# Patient Record
Sex: FEMALE | Race: OTHER | Employment: STUDENT | URBAN - METROPOLITAN AREA
[De-identification: names, ages, dates, MRNs, and addresses within clinical notes are randomized per-mention and may not be internally consistent; named-entity substitution may affect disease eponyms.]

---
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
## 2021-03-29 ENCOUNTER — HOSPITAL ENCOUNTER (OUTPATIENT)
Age: 21
Discharge: HOME | End: 2021-03-29
Payer: COMMERCIAL

## 2021-03-29 VITALS
RESPIRATION RATE: 16 BRPM | WEIGHT: 160 LBS | HEIGHT: 62 IN | SYSTOLIC BLOOD PRESSURE: 112 MMHG | BODY MASS INDEX: 29.44 KG/M2 | OXYGEN SATURATION: 98 % | DIASTOLIC BLOOD PRESSURE: 73 MMHG | HEART RATE: 74 BPM | TEMPERATURE: 97 F

## 2021-03-29 DIAGNOSIS — J02.0 STREPTOCOCCAL SORE THROAT: Primary | ICD-10-CM

## 2021-03-29 DIAGNOSIS — Z20.822 ENCOUNTER FOR LABORATORY TESTING FOR COVID-19 VIRUS: ICD-10-CM

## 2021-03-29 LAB
S PYO AG THROAT QL: POSITIVE
SARS-COV-2 RNA RESP QL NAA+PROBE: NOT DETECTED

## 2021-03-29 PROCEDURE — U0002 COVID-19 LAB TEST NON-CDC: HCPCS | Performed by: NURSE PRACTITIONER

## 2021-03-29 PROCEDURE — 99213 OFFICE O/P EST LOW 20 MIN: CPT | Performed by: NURSE PRACTITIONER

## 2021-03-29 PROCEDURE — 87880 STREP A ASSAY W/OPTIC: CPT | Performed by: NURSE PRACTITIONER

## 2021-03-29 RX ORDER — PENICILLIN V POTASSIUM 500 MG/1
500 TABLET ORAL 2 TIMES DAILY
Qty: 20 TABLET | Refills: 0 | Status: SHIPPED | OUTPATIENT
Start: 2021-03-29 | End: 2021-04-08

## 2021-03-29 NOTE — ED PROVIDER NOTES
Patient Seen in: [facility]      History   Patient presents with:  Sore Throat    Stated Complaint: sorethroat    HPI/Subjective:   Well-appearing 72-year-old female with no past medical history, presents with complaints of a sore throat for Pharynx: Uvula midline. Posterior oropharyngeal erythema present. No pharyngeal swelling, oropharyngeal exudate or uvula swelling. Tonsils: Tonsillar exudate present. No tonsillar abscesses. 1+ on the right. 1+ on the left.      Neck: No cervical day for 10 days.   Qty: 20 tablet Refills: 0

## 2021-05-18 ENCOUNTER — HOSPITAL ENCOUNTER (OUTPATIENT)
Age: 21
Discharge: HOME | End: 2021-05-18
Payer: COMMERCIAL

## 2021-05-18 VITALS
HEIGHT: 63 IN | SYSTOLIC BLOOD PRESSURE: 123 MMHG | HEART RATE: 92 BPM | WEIGHT: 154 LBS | DIASTOLIC BLOOD PRESSURE: 67 MMHG | TEMPERATURE: 99 F | OXYGEN SATURATION: 99 % | RESPIRATION RATE: 18 BRPM | BODY MASS INDEX: 27.29 KG/M2

## 2021-05-18 DIAGNOSIS — J02.0 STREP PHARYNGITIS: ICD-10-CM

## 2021-05-18 DIAGNOSIS — J02.9 SORE THROAT: Primary | ICD-10-CM

## 2021-05-18 PROCEDURE — 87880 STREP A ASSAY W/OPTIC: CPT | Performed by: NURSE PRACTITIONER

## 2021-05-18 PROCEDURE — 99213 OFFICE O/P EST LOW 20 MIN: CPT | Performed by: NURSE PRACTITIONER

## 2021-05-18 RX ORDER — PENICILLIN V POTASSIUM 500 MG/1
500 TABLET ORAL 2 TIMES DAILY
Qty: 20 TABLET | Refills: 0 | Status: SHIPPED | OUTPATIENT
Start: 2021-05-18 | End: 2021-05-18 | Stop reason: SDUPTHER

## 2021-05-18 RX ORDER — PENICILLIN V POTASSIUM 500 MG/1
500 TABLET ORAL 2 TIMES DAILY
Qty: 20 TABLET | Refills: 0 | Status: SHIPPED | OUTPATIENT
Start: 2021-05-18 | End: 2021-05-28

## 2021-05-19 NOTE — ED PROVIDER NOTES
Patient Seen in: [facility]      History   Patient presents with:  Sore Throat    Stated Complaint: sore throat    HPI/Subjective:   HPI    18-year-old female presents to the immediate care with complaints of sore throat for 2 days.   Her sibl All other components within normal limits     Strep screen positive            MDM      Strep screen positive  No PTA, exudate or swelling uvula is midline  Afebrile  Penicillin twice a day for 10 days  PMD follow-up  Strict precautions given

## 2021-05-31 ENCOUNTER — HOSPITAL ENCOUNTER (OUTPATIENT)
Age: 21
Discharge: HOME | End: 2021-05-31
Payer: COMMERCIAL

## 2021-05-31 VITALS
TEMPERATURE: 98 F | SYSTOLIC BLOOD PRESSURE: 125 MMHG | HEART RATE: 89 BPM | WEIGHT: 155 LBS | OXYGEN SATURATION: 99 % | HEIGHT: 62 IN | RESPIRATION RATE: 18 BRPM | DIASTOLIC BLOOD PRESSURE: 87 MMHG | BODY MASS INDEX: 28.52 KG/M2

## 2021-05-31 DIAGNOSIS — J02.9 SORE THROAT: Primary | ICD-10-CM

## 2021-05-31 PROCEDURE — 99213 OFFICE O/P EST LOW 20 MIN: CPT | Performed by: PHYSICIAN ASSISTANT

## 2021-05-31 RX ORDER — DEXAMETHASONE SODIUM PHOSPHATE 10 MG/ML
10 INJECTION, SOLUTION INTRAMUSCULAR; INTRAVENOUS ONCE
Status: COMPLETED | OUTPATIENT
Start: 2021-05-31 | End: 2021-05-31

## 2021-05-31 NOTE — ED PROVIDER NOTES
Patient Seen in: [facility]      History   Patient presents with:  Swelling    Stated Complaint: Sore Throat    HPI/Subjective:   HPI    23 yo female here for evaluation of sore throat.   Pt states she completed treatment for strep 3 days ago Cardiovascular:      Rate and Rhythm: Normal rate. Pulmonary:      Effort: Pulmonary effort is normal.   Abdominal:      General: Abdomen is flat. Musculoskeletal:         General: Normal range of motion. Cervical back: Normal range of motion.

## 2021-05-31 NOTE — ED INITIAL ASSESSMENT (HPI)
Seen here 5/18 with strep treated and finished po meds here now because she feels she has swelling rt side of neck easy non labored rep, mmm no drooling.

## 2021-05-31 NOTE — ED QUICK NOTES
Po decadron given note for work provided. Call and follow up with pcp in office. Watch for increased swelling thickness in throat pain fever- go to the ed.

## 2021-08-14 ENCOUNTER — LAB ENCOUNTER (OUTPATIENT)
Dept: LAB | Age: 21
End: 2021-08-14
Attending: NURSE PRACTITIONER
Payer: COMMERCIAL

## 2021-08-14 ENCOUNTER — OFFICE VISIT (OUTPATIENT)
Dept: FAMILY MEDICINE CLINIC | Facility: CLINIC | Age: 21
End: 2021-08-14
Payer: MEDICAID

## 2021-08-14 VITALS
BODY MASS INDEX: 31.47 KG/M2 | SYSTOLIC BLOOD PRESSURE: 106 MMHG | HEART RATE: 101 BPM | DIASTOLIC BLOOD PRESSURE: 73 MMHG | TEMPERATURE: 99 F | HEIGHT: 62 IN | WEIGHT: 171 LBS

## 2021-08-14 DIAGNOSIS — Z11.3 SCREEN FOR STD (SEXUALLY TRANSMITTED DISEASE): ICD-10-CM

## 2021-08-14 DIAGNOSIS — Z00.00 WELL ADULT EXAM: Primary | ICD-10-CM

## 2021-08-14 DIAGNOSIS — Z30.44 ENCOUNTER FOR SURVEILLANCE OF VAGINAL RING HORMONAL CONTRACEPTIVE DEVICE: ICD-10-CM

## 2021-08-14 DIAGNOSIS — L73.9 PERINEAL FOLLICULITIS: ICD-10-CM

## 2021-08-14 DIAGNOSIS — Z00.00 WELL ADULT EXAM: ICD-10-CM

## 2021-08-14 LAB
ALBUMIN SERPL-MCNC: 3.5 G/DL (ref 3.4–5)
ALBUMIN/GLOB SERPL: 0.9 {RATIO} (ref 1–2)
ALP LIVER SERPL-CCNC: 74 U/L
ALT SERPL-CCNC: 22 U/L
ANION GAP SERPL CALC-SCNC: 8 MMOL/L (ref 0–18)
AST SERPL-CCNC: 19 U/L (ref 15–37)
BASOPHILS # BLD AUTO: 0.03 X10(3) UL (ref 0–0.2)
BASOPHILS NFR BLD AUTO: 0.5 %
BILIRUB SERPL-MCNC: 0.4 MG/DL (ref 0.1–2)
BUN BLD-MCNC: 12 MG/DL (ref 7–18)
BUN/CREAT SERPL: 17.6 (ref 10–20)
CALCIUM BLD-MCNC: 9 MG/DL (ref 8.5–10.1)
CHLORIDE SERPL-SCNC: 108 MMOL/L (ref 98–112)
CHOLEST SMN-MCNC: 212 MG/DL (ref ?–200)
CO2 SERPL-SCNC: 23 MMOL/L (ref 21–32)
CREAT BLD-MCNC: 0.68 MG/DL
DEPRECATED RDW RBC AUTO: 38.4 FL (ref 35.1–46.3)
EOSINOPHIL # BLD AUTO: 0.07 X10(3) UL (ref 0–0.7)
EOSINOPHIL NFR BLD AUTO: 1.1 %
ERYTHROCYTE [DISTWIDTH] IN BLOOD BY AUTOMATED COUNT: 11.9 % (ref 11–15)
GLOBULIN PLAS-MCNC: 3.7 G/DL (ref 2.8–4.4)
GLUCOSE BLD-MCNC: 82 MG/DL (ref 70–99)
HCT VFR BLD AUTO: 40.6 %
HDLC SERPL-MCNC: 80 MG/DL (ref 40–59)
HGB BLD-MCNC: 14 G/DL
IMM GRANULOCYTES # BLD AUTO: 0.01 X10(3) UL (ref 0–1)
IMM GRANULOCYTES NFR BLD: 0.2 %
LDLC SERPL CALC-MCNC: 115 MG/DL (ref ?–100)
LYMPHOCYTES # BLD AUTO: 1.98 X10(3) UL (ref 1–4)
LYMPHOCYTES NFR BLD AUTO: 30.6 %
M PROTEIN MFR SERPL ELPH: 7.2 G/DL (ref 6.4–8.2)
MCH RBC QN AUTO: 30.6 PG (ref 26–34)
MCHC RBC AUTO-ENTMCNC: 34.5 G/DL (ref 31–37)
MCV RBC AUTO: 88.8 FL
MONOCYTES # BLD AUTO: 0.35 X10(3) UL (ref 0.1–1)
MONOCYTES NFR BLD AUTO: 5.4 %
NEUTROPHILS # BLD AUTO: 4.03 X10 (3) UL (ref 1.5–7.7)
NEUTROPHILS # BLD AUTO: 4.03 X10(3) UL (ref 1.5–7.7)
NEUTROPHILS NFR BLD AUTO: 62.2 %
NONHDLC SERPL-MCNC: 132 MG/DL (ref ?–130)
OSMOLALITY SERPL CALC.SUM OF ELEC: 287 MOSM/KG (ref 275–295)
PATIENT FASTING Y/N/NP: YES
PATIENT FASTING Y/N/NP: YES
PLATELET # BLD AUTO: 225 10(3)UL (ref 150–450)
POTASSIUM SERPL-SCNC: 4 MMOL/L (ref 3.5–5.1)
RBC # BLD AUTO: 4.57 X10(6)UL
SODIUM SERPL-SCNC: 139 MMOL/L (ref 136–145)
TRIGL SERPL-MCNC: 99 MG/DL (ref 30–149)
TSI SER-ACNC: 2.91 MIU/ML (ref 0.36–3.74)
VLDLC SERPL CALC-MCNC: 17 MG/DL (ref 0–30)
WBC # BLD AUTO: 6.5 X10(3) UL (ref 4–11)

## 2021-08-14 PROCEDURE — 3008F BODY MASS INDEX DOCD: CPT | Performed by: NURSE PRACTITIONER

## 2021-08-14 PROCEDURE — 87389 HIV-1 AG W/HIV-1&-2 AB AG IA: CPT

## 2021-08-14 PROCEDURE — 86780 TREPONEMA PALLIDUM: CPT

## 2021-08-14 PROCEDURE — 80061 LIPID PANEL: CPT

## 2021-08-14 PROCEDURE — 80053 COMPREHEN METABOLIC PANEL: CPT

## 2021-08-14 PROCEDURE — 36415 COLL VENOUS BLD VENIPUNCTURE: CPT

## 2021-08-14 PROCEDURE — 99395 PREV VISIT EST AGE 18-39: CPT | Performed by: NURSE PRACTITIONER

## 2021-08-14 PROCEDURE — 84443 ASSAY THYROID STIM HORMONE: CPT

## 2021-08-14 PROCEDURE — 3074F SYST BP LT 130 MM HG: CPT | Performed by: NURSE PRACTITIONER

## 2021-08-14 PROCEDURE — 87491 CHLMYD TRACH DNA AMP PROBE: CPT

## 2021-08-14 PROCEDURE — 85025 COMPLETE CBC W/AUTO DIFF WBC: CPT

## 2021-08-14 PROCEDURE — 87591 N.GONORRHOEAE DNA AMP PROB: CPT

## 2021-08-14 PROCEDURE — 3078F DIAST BP <80 MM HG: CPT | Performed by: NURSE PRACTITIONER

## 2021-08-14 RX ORDER — ETONOGESTREL AND ETHINYL ESTRADIOL 11.7; 2.7 MG/1; MG/1
INSERT, EXTENDED RELEASE VAGINAL
Qty: 3 EACH | Refills: 3 | Status: SHIPPED | OUTPATIENT
Start: 2021-08-14 | End: 2021-12-07

## 2021-08-14 NOTE — PROGRESS NOTES
HPI    Patient presents for annual physical.  Negative for past medical history. Gyne history - G0. LMP - 7/20/21. Birth control - nuvaring. Diet - diet is fair. Exercise - exercising regularly. Immunizations - up to date.      Review of System history.     Social History    Socioeconomic History      Marital status: Single      Spouse name: Not on file      Number of children: Not on file      Years of education: Not on file      Highest education level: Not on file    Occupational History      N Appearance: Normal appearance. She is well-developed. She is not ill-appearing, toxic-appearing or diaphoretic. HENT:      Head: Normocephalic and atraumatic.       Right Ear: Hearing, tympanic membrane, ear canal and external ear normal. There is no impa SCREENING CASCADE    Well adult exam - Primary    Relevant Medications    Etonogestrel-Ethinyl Estradiol 0.12-0.015 MG/24HR Vaginal Ring    Other Relevant Orders    LIPID PANEL    COMP METABOLIC PANEL (14)    CBC WITH DIFFERENTIAL WITH PLATELET    TSH W RE

## 2021-08-16 LAB
C TRACH DNA SPEC QL NAA+PROBE: NEGATIVE
N GONORRHOEA DNA SPEC QL NAA+PROBE: NEGATIVE
T PALLIDUM AB SER QL: NEGATIVE

## 2021-12-07 ENCOUNTER — TELEPHONE (OUTPATIENT)
Dept: FAMILY MEDICINE CLINIC | Facility: CLINIC | Age: 21
End: 2021-12-07

## 2021-12-07 DIAGNOSIS — Z30.44 ENCOUNTER FOR SURVEILLANCE OF VAGINAL RING HORMONAL CONTRACEPTIVE DEVICE: Primary | ICD-10-CM

## 2021-12-07 RX ORDER — SEGESTERONE ACETATE AND ETHINYL ESTRADIOL 103; 17.4 MG/1; MG/1
1 RING VAGINAL
Qty: 3 EACH | Refills: 3 | Status: SHIPPED | OUTPATIENT
Start: 2021-12-07 | End: 2021-12-09

## 2021-12-07 NOTE — TELEPHONE ENCOUNTER
Patient reporting that per pharmacy, Dennice Wright is being discontinued and needs an order for alternate ring. Order sent into pharmacy on file.

## 2021-12-08 ENCOUNTER — TELEPHONE (OUTPATIENT)
Dept: FAMILY MEDICINE CLINIC | Facility: CLINIC | Age: 21
End: 2021-12-08

## 2021-12-08 NOTE — TELEPHONE ENCOUNTER
Current Outpatient Medications:   •  Segesterone-Ethinyl Estradiol (ANNOVERA) 0.15-0.013 MG/24HR Vaginal Ring, Place 1 ring vaginally every 28 days. , Disp: 3 each, Rfl: 3  •  mupirocin 2 % External Ointment, Apply to affect lesions tid, Disp: 30 g, Rfl:

## 2021-12-09 RX ORDER — ETONOGESTREL/ETHINYL ESTRADIOL .12-.015MG
RING, VAGINAL VAGINAL
Qty: 3 RING | Refills: 3 | Status: SHIPPED | OUTPATIENT
Start: 2021-12-09

## 2021-12-09 NOTE — TELEPHONE ENCOUNTER
Please call pharmacy and determine what alternatives there are. Patient was told by pharmacy that NuvaRing was no longer covered.

## 2021-12-16 ENCOUNTER — TELEPHONE (OUTPATIENT)
Dept: FAMILY MEDICINE CLINIC | Facility: CLINIC | Age: 21
End: 2021-12-16

## 2021-12-16 NOTE — TELEPHONE ENCOUNTER
Please call and inform patient that on backorder. May try an alternate form of birth control. If she is agreeable, I will send new script in.

## 2021-12-16 NOTE — TELEPHONE ENCOUNTER
Rx: Eluryng Vaginal Ring  Sig: Place 1 Ring Vaginally Every 28 Days  Qty: 3    Pharmacy Message: Product Backordered/Unavailable. Thank you in advance for takiing the time to review this information.     Sincerely,  Your Local [facility] Pharmacist

## 2021-12-16 NOTE — TELEPHONE ENCOUNTER
Spoke with patient, verified name and . Advised patient of message below, patient Is asking if an alternate was to be sent, would it be something about the same or would it completely different? Please advise.

## 2021-12-17 NOTE — TELEPHONE ENCOUNTER
Patient hesitant to switch to alternate birth control. Will only do as last resort. Still has 2 weeks left on current ring. Requesting to know how long on backorder and if if will come in before the 2 weeks is up.       Spoke with Desirae Tolbert of [address] [place] [address] pharmacy

## 2021-12-28 ENCOUNTER — NURSE ONLY (OUTPATIENT)
Dept: LAB | Age: 21
End: 2021-12-28
Attending: NURSE PRACTITIONER
Payer: COMMERCIAL

## 2021-12-28 DIAGNOSIS — Z20.822 ENCOUNTER FOR SCREENING LABORATORY TESTING FOR COVID-19 VIRUS IN ASYMPTOMATIC PATIENT: ICD-10-CM

## 2021-12-29 LAB — SARS-COV-2 RNA RESP QL NAA+PROBE: NOT DETECTED

## 2022-08-02 ENCOUNTER — OFFICE VISIT (OUTPATIENT)
Dept: FAMILY MEDICINE CLINIC | Facility: CLINIC | Age: 22
End: 2022-08-02
Payer: COMMERCIAL

## 2022-08-02 ENCOUNTER — PATIENT MESSAGE (OUTPATIENT)
Dept: FAMILY MEDICINE CLINIC | Facility: CLINIC | Age: 22
End: 2022-08-02

## 2022-08-02 VITALS
WEIGHT: 170 LBS | BODY MASS INDEX: 31.28 KG/M2 | HEIGHT: 62 IN | SYSTOLIC BLOOD PRESSURE: 127 MMHG | HEART RATE: 74 BPM | DIASTOLIC BLOOD PRESSURE: 84 MMHG

## 2022-08-02 DIAGNOSIS — G44.209 TENSION HEADACHE: Primary | ICD-10-CM

## 2022-08-02 PROCEDURE — 99213 OFFICE O/P EST LOW 20 MIN: CPT | Performed by: NURSE PRACTITIONER

## 2022-08-02 PROCEDURE — 3008F BODY MASS INDEX DOCD: CPT | Performed by: NURSE PRACTITIONER

## 2022-08-02 PROCEDURE — 3074F SYST BP LT 130 MM HG: CPT | Performed by: NURSE PRACTITIONER

## 2022-08-02 PROCEDURE — 3079F DIAST BP 80-89 MM HG: CPT | Performed by: NURSE PRACTITIONER

## 2022-08-02 RX ORDER — IBUPROFEN 600 MG/1
600 TABLET ORAL EVERY 6 HOURS PRN
Qty: 40 TABLET | Refills: 0 | Status: SHIPPED | OUTPATIENT
Start: 2022-08-02 | End: 2022-09-01

## 2022-08-02 RX ORDER — CYCLOBENZAPRINE HCL 5 MG
5 TABLET ORAL 3 TIMES DAILY PRN
Qty: 30 TABLET | Refills: 0 | Status: SHIPPED | OUTPATIENT
Start: 2022-08-02

## 2022-10-19 ENCOUNTER — IMMUNIZATION (OUTPATIENT)
Dept: FAMILY MEDICINE CLINIC | Facility: CLINIC | Age: 22
End: 2022-10-19
Payer: COMMERCIAL

## 2022-10-19 DIAGNOSIS — Z23 NEED FOR VACCINATION: Primary | ICD-10-CM

## 2022-10-19 PROCEDURE — 90686 IIV4 VACC NO PRSV 0.5 ML IM: CPT | Performed by: FAMILY MEDICINE

## 2022-10-19 PROCEDURE — 90471 IMMUNIZATION ADMIN: CPT | Performed by: FAMILY MEDICINE

## 2022-10-27 ENCOUNTER — OFFICE VISIT (OUTPATIENT)
Dept: FAMILY MEDICINE CLINIC | Facility: CLINIC | Age: 22
End: 2022-10-27
Payer: COMMERCIAL

## 2022-10-27 ENCOUNTER — LAB ENCOUNTER (OUTPATIENT)
Dept: LAB | Age: 22
End: 2022-10-27
Attending: PHYSICIAN ASSISTANT
Payer: COMMERCIAL

## 2022-10-27 VITALS
HEIGHT: 62 IN | BODY MASS INDEX: 32.2 KG/M2 | WEIGHT: 175 LBS | DIASTOLIC BLOOD PRESSURE: 79 MMHG | HEART RATE: 91 BPM | SYSTOLIC BLOOD PRESSURE: 111 MMHG

## 2022-10-27 DIAGNOSIS — E55.9 VITAMIN D DEFICIENCY: ICD-10-CM

## 2022-10-27 DIAGNOSIS — Z11.3 SCREENING EXAMINATION FOR STD (SEXUALLY TRANSMITTED DISEASE): ICD-10-CM

## 2022-10-27 DIAGNOSIS — Z00.00 ROUTINE GENERAL MEDICAL EXAMINATION AT A HEALTH CARE FACILITY: Primary | ICD-10-CM

## 2022-10-27 DIAGNOSIS — Z00.00 ROUTINE GENERAL MEDICAL EXAMINATION AT A HEALTH CARE FACILITY: ICD-10-CM

## 2022-10-27 LAB
ALBUMIN SERPL-MCNC: 3.7 G/DL (ref 3.4–5)
ALBUMIN/GLOB SERPL: 1 {RATIO} (ref 1–2)
ALP LIVER SERPL-CCNC: 72 U/L
ALT SERPL-CCNC: 21 U/L
ANION GAP SERPL CALC-SCNC: 8 MMOL/L (ref 0–18)
AST SERPL-CCNC: 17 U/L (ref 15–37)
BASOPHILS # BLD AUTO: 0.04 X10(3) UL (ref 0–0.2)
BASOPHILS NFR BLD AUTO: 0.6 %
BILIRUB SERPL-MCNC: 0.6 MG/DL (ref 0.1–2)
BUN BLD-MCNC: 9 MG/DL (ref 7–18)
BUN/CREAT SERPL: 12.2 (ref 10–20)
CALCIUM BLD-MCNC: 9.6 MG/DL (ref 8.5–10.1)
CHLORIDE SERPL-SCNC: 105 MMOL/L (ref 98–112)
CHOLEST SERPL-MCNC: 206 MG/DL (ref ?–200)
CO2 SERPL-SCNC: 26 MMOL/L (ref 21–32)
CREAT BLD-MCNC: 0.74 MG/DL
DEPRECATED RDW RBC AUTO: 41.3 FL (ref 35.1–46.3)
EOSINOPHIL # BLD AUTO: 0.07 X10(3) UL (ref 0–0.7)
EOSINOPHIL NFR BLD AUTO: 1 %
ERYTHROCYTE [DISTWIDTH] IN BLOOD BY AUTOMATED COUNT: 12.3 % (ref 11–15)
FASTING PATIENT LIPID ANSWER: YES
FASTING STATUS PATIENT QL REPORTED: YES
GFR SERPLBLD BASED ON 1.73 SQ M-ARVRAT: 118 ML/MIN/1.73M2 (ref 60–?)
GLOBULIN PLAS-MCNC: 3.6 G/DL (ref 2.8–4.4)
GLUCOSE BLD-MCNC: 79 MG/DL (ref 70–99)
HCT VFR BLD AUTO: 43.2 %
HDLC SERPL-MCNC: 79 MG/DL (ref 40–59)
HGB BLD-MCNC: 14.4 G/DL
IMM GRANULOCYTES # BLD AUTO: 0.03 X10(3) UL (ref 0–1)
IMM GRANULOCYTES NFR BLD: 0.4 %
LDLC SERPL CALC-MCNC: 109 MG/DL (ref ?–100)
LYMPHOCYTES # BLD AUTO: 2.32 X10(3) UL (ref 1–4)
LYMPHOCYTES NFR BLD AUTO: 32.8 %
MCH RBC QN AUTO: 30.7 PG (ref 26–34)
MCHC RBC AUTO-ENTMCNC: 33.3 G/DL (ref 31–37)
MCV RBC AUTO: 92.1 FL
MONOCYTES # BLD AUTO: 0.37 X10(3) UL (ref 0.1–1)
MONOCYTES NFR BLD AUTO: 5.2 %
NEUTROPHILS # BLD AUTO: 4.25 X10 (3) UL (ref 1.5–7.7)
NEUTROPHILS # BLD AUTO: 4.25 X10(3) UL (ref 1.5–7.7)
NEUTROPHILS NFR BLD AUTO: 60 %
NONHDLC SERPL-MCNC: 127 MG/DL (ref ?–130)
OSMOLALITY SERPL CALC.SUM OF ELEC: 286 MOSM/KG (ref 275–295)
PLATELET # BLD AUTO: 262 10(3)UL (ref 150–450)
POTASSIUM SERPL-SCNC: 4.6 MMOL/L (ref 3.5–5.1)
PROT SERPL-MCNC: 7.3 G/DL (ref 6.4–8.2)
RBC # BLD AUTO: 4.69 X10(6)UL
SODIUM SERPL-SCNC: 139 MMOL/L (ref 136–145)
T4 FREE SERPL-MCNC: 1.4 NG/DL (ref 0.8–1.7)
TRIGL SERPL-MCNC: 106 MG/DL (ref 30–149)
TSI SER-ACNC: 5.26 MIU/ML (ref 0.36–3.74)
VIT D+METAB SERPL-MCNC: 17.2 NG/ML (ref 30–100)
VLDLC SERPL CALC-MCNC: 18 MG/DL (ref 0–30)
WBC # BLD AUTO: 7.1 X10(3) UL (ref 4–11)

## 2022-10-27 PROCEDURE — 80053 COMPREHEN METABOLIC PANEL: CPT

## 2022-10-27 PROCEDURE — 3078F DIAST BP <80 MM HG: CPT | Performed by: PHYSICIAN ASSISTANT

## 2022-10-27 PROCEDURE — 84439 ASSAY OF FREE THYROXINE: CPT

## 2022-10-27 PROCEDURE — 3074F SYST BP LT 130 MM HG: CPT | Performed by: PHYSICIAN ASSISTANT

## 2022-10-27 PROCEDURE — 86780 TREPONEMA PALLIDUM: CPT

## 2022-10-27 PROCEDURE — 3008F BODY MASS INDEX DOCD: CPT | Performed by: PHYSICIAN ASSISTANT

## 2022-10-27 PROCEDURE — 84443 ASSAY THYROID STIM HORMONE: CPT

## 2022-10-27 PROCEDURE — 80061 LIPID PANEL: CPT

## 2022-10-27 PROCEDURE — 85025 COMPLETE CBC W/AUTO DIFF WBC: CPT

## 2022-10-27 PROCEDURE — 99395 PREV VISIT EST AGE 18-39: CPT | Performed by: PHYSICIAN ASSISTANT

## 2022-10-27 PROCEDURE — 36415 COLL VENOUS BLD VENIPUNCTURE: CPT

## 2022-10-27 PROCEDURE — 90471 IMMUNIZATION ADMIN: CPT | Performed by: PHYSICIAN ASSISTANT

## 2022-10-27 PROCEDURE — 82306 VITAMIN D 25 HYDROXY: CPT

## 2022-10-27 PROCEDURE — 87389 HIV-1 AG W/HIV-1&-2 AB AG IA: CPT

## 2022-10-27 PROCEDURE — 90715 TDAP VACCINE 7 YRS/> IM: CPT | Performed by: PHYSICIAN ASSISTANT

## 2022-10-28 LAB — T PALLIDUM AB SER QL: NEGATIVE

## 2023-04-26 ENCOUNTER — LAB ENCOUNTER (OUTPATIENT)
Dept: LAB | Age: 23
End: 2023-04-26
Attending: PHYSICIAN ASSISTANT
Payer: COMMERCIAL

## 2023-04-26 ENCOUNTER — OFFICE VISIT (OUTPATIENT)
Dept: FAMILY MEDICINE CLINIC | Facility: CLINIC | Age: 23
End: 2023-04-26

## 2023-04-26 VITALS
WEIGHT: 170 LBS | SYSTOLIC BLOOD PRESSURE: 117 MMHG | BODY MASS INDEX: 31.28 KG/M2 | HEART RATE: 98 BPM | HEIGHT: 62 IN | DIASTOLIC BLOOD PRESSURE: 77 MMHG

## 2023-04-26 DIAGNOSIS — Z01.419 ENCOUNTER FOR ROUTINE GYNECOLOGICAL EXAMINATION WITH PAPANICOLAOU SMEAR OF CERVIX: ICD-10-CM

## 2023-04-26 DIAGNOSIS — Z30.019 ENCOUNTER FOR FEMALE BIRTH CONTROL: ICD-10-CM

## 2023-04-26 DIAGNOSIS — N89.8 VAGINAL DISCHARGE: Primary | ICD-10-CM

## 2023-04-26 LAB — HCG SERPL QL: NEGATIVE

## 2023-04-26 PROCEDURE — 3074F SYST BP LT 130 MM HG: CPT | Performed by: PHYSICIAN ASSISTANT

## 2023-04-26 PROCEDURE — 99213 OFFICE O/P EST LOW 20 MIN: CPT | Performed by: PHYSICIAN ASSISTANT

## 2023-04-26 PROCEDURE — 3078F DIAST BP <80 MM HG: CPT | Performed by: PHYSICIAN ASSISTANT

## 2023-04-26 PROCEDURE — 84703 CHORIONIC GONADOTROPIN ASSAY: CPT

## 2023-04-26 PROCEDURE — 36415 COLL VENOUS BLD VENIPUNCTURE: CPT

## 2023-04-26 PROCEDURE — 3008F BODY MASS INDEX DOCD: CPT | Performed by: PHYSICIAN ASSISTANT

## 2023-04-26 RX ORDER — ETONOGESTREL/ETHINYL ESTRADIOL .12-.015MG
RING, VAGINAL VAGINAL
Qty: 3 RING | Refills: 3 | Status: SHIPPED | OUTPATIENT
Start: 2023-04-26

## 2023-04-27 LAB
C TRACH DNA SPEC QL NAA+PROBE: NEGATIVE
N GONORRHOEA DNA SPEC QL NAA+PROBE: NEGATIVE

## 2023-04-29 LAB
GENITAL VAGINOSIS SCREEN: NEGATIVE
TRICHOMONAS SCREEN: NEGATIVE

## 2023-05-15 NOTE — TELEPHONE ENCOUNTER
Please review. Protocol failed/No protocol. Pt had a PAP done on 4/26 but no results yet. Please advise.        Requested Prescriptions   Pending Prescriptions Disp Refills    NUVARING 0.12-0.015 MG/24HR Vaginal Ring [Pharmacy Med Name: Yanique Moss RING]  3     Sig: Place 1 ring vaginally every 28 days       Gynecology Medication Protocol Failed - 5/12/2023  6:52 PM        Failed - Pass dependent on manual look-up of last PAP and patient compliance with PAP follow up recommendations        Passed - In person appointment or virtual visit in the past 12 mos or appointment in next 3 mos     Recent Outpatient Visits              2 weeks ago Vaginal discharge    Arthurdorian Mahajan,  [employer] [place], [place], [employer]    Office Visit    6 months ago Routine general medical examination at a health care facility    Chunky Keyshawn,  [facility], [place] [employer], [employer]    Office Visit    9 months ago Tension headache    Mart Lowery, [address] [place], APRN    Office Visit    1 year ago Encounter for screening laboratory testing for COVID-19 virus in asymptomatic patient    [facility], [address]    Nurse Only    1 year ago Well adult exam    Chunkydorian Mahajan, [address], [facility] [place], [address] Visits              2 weeks ago Vaginal discharge    Chunkydorian Mahajan,  [facility], [place], [employer]    Office Visit    6 months ago Routine general medical examination at a health care facility    Chunky Keyshawn,  [facility], [place], [employer]    Office Visit    9 months ago Tension headache    Mart Lowery, [address] [place], APRN    Office Visit    1 year ago Encounter for screening laboratory testing for COVID-19 virus in asymptomatic patient    Vee Miquel Macias, [place]    Nurse Only    1 year ago Well adult exam    [address] [facility], [address] [place] [address] [place], [address]    Office Visit

## 2023-05-16 RX ORDER — ETONOGESTREL AND ETHINYL ESTRADIOL 11.7; 2.7 MG/1; MG/1
INSERT, EXTENDED RELEASE VAGINAL
Qty: 3 RING | Refills: 3 | Status: SHIPPED | OUTPATIENT
Start: 2023-05-16

## 2023-05-22 LAB — HPV I/H RISK 1 DNA SPEC QL NAA+PROBE: NEGATIVE

## 2023-06-05 ENCOUNTER — OFFICE VISIT (OUTPATIENT)
Dept: OBGYN CLINIC | Facility: CLINIC | Age: 23
End: 2023-06-05

## 2023-06-05 VITALS
SYSTOLIC BLOOD PRESSURE: 126 MMHG | BODY MASS INDEX: 30.85 KG/M2 | DIASTOLIC BLOOD PRESSURE: 80 MMHG | WEIGHT: 167.63 LBS | HEIGHT: 62 IN

## 2023-06-05 DIAGNOSIS — R87.610 ASCUS OF CERVIX WITH NEGATIVE HIGH RISK HPV: Primary | ICD-10-CM

## 2023-06-05 PROBLEM — Z00.00 WELL ADULT EXAM: Status: RESOLVED | Noted: 2020-07-08 | Resolved: 2023-06-05

## 2023-06-05 PROBLEM — Z30.015 ENCOUNTER FOR INITIAL PRESCRIPTION OF VAGINAL RING HORMONAL CONTRACEPTIVE: Status: RESOLVED | Noted: 2019-11-12 | Resolved: 2023-06-05

## 2023-06-05 PROCEDURE — 3079F DIAST BP 80-89 MM HG: CPT | Performed by: OBSTETRICS & GYNECOLOGY

## 2023-06-05 PROCEDURE — 3074F SYST BP LT 130 MM HG: CPT | Performed by: OBSTETRICS & GYNECOLOGY

## 2023-06-05 PROCEDURE — 3008F BODY MASS INDEX DOCD: CPT | Performed by: OBSTETRICS & GYNECOLOGY

## 2023-06-05 PROCEDURE — 99203 OFFICE O/P NEW LOW 30 MIN: CPT | Performed by: OBSTETRICS & GYNECOLOGY

## 2023-07-07 ENCOUNTER — HOSPITAL ENCOUNTER (EMERGENCY)
Facility: HOSPITAL | Age: 23
Discharge: LEFT BEFORE DISCHARGE | End: 2023-07-07
Attending: EMERGENCY MEDICINE
Payer: COMMERCIAL

## 2023-07-07 VITALS
RESPIRATION RATE: 18 BRPM | DIASTOLIC BLOOD PRESSURE: 82 MMHG | SYSTOLIC BLOOD PRESSURE: 129 MMHG | HEART RATE: 82 BPM | OXYGEN SATURATION: 99 % | TEMPERATURE: 98 F

## 2023-07-07 PROCEDURE — 93005 ELECTROCARDIOGRAM TRACING: CPT

## 2023-07-08 LAB
ATRIAL RATE: 97 BPM
P AXIS: 56 DEGREES
P-R INTERVAL: 136 MS
Q-T INTERVAL: 348 MS
QRS DURATION: 70 MS
QTC CALCULATION (BEZET): 441 MS
R AXIS: 42 DEGREES
T AXIS: 25 DEGREES
VENTRICULAR RATE: 97 BPM

## 2023-08-21 ENCOUNTER — HOSPITAL ENCOUNTER (OUTPATIENT)
Age: 23
Discharge: HOME | End: 2023-08-21
Payer: COMMERCIAL

## 2023-08-21 VITALS
RESPIRATION RATE: 18 BRPM | BODY MASS INDEX: 31.15 KG/M2 | SYSTOLIC BLOOD PRESSURE: 125 MMHG | TEMPERATURE: 97 F | HEIGHT: 61 IN | DIASTOLIC BLOOD PRESSURE: 62 MMHG | WEIGHT: 165 LBS | OXYGEN SATURATION: 99 % | HEART RATE: 109 BPM

## 2023-08-21 DIAGNOSIS — R07.0 THROAT PAIN: Primary | ICD-10-CM

## 2023-08-21 DIAGNOSIS — B34.9 VIRAL SYNDROME: ICD-10-CM

## 2023-08-21 LAB
S PYO AG THROAT QL: NEGATIVE
SARS-COV-2 RNA RESP QL NAA+PROBE: NOT DETECTED

## 2023-08-21 PROCEDURE — 99213 OFFICE O/P EST LOW 20 MIN: CPT | Performed by: NURSE PRACTITIONER

## 2023-08-21 PROCEDURE — U0002 COVID-19 LAB TEST NON-CDC: HCPCS | Performed by: NURSE PRACTITIONER

## 2023-08-21 PROCEDURE — 87880 STREP A ASSAY W/OPTIC: CPT | Performed by: NURSE PRACTITIONER

## 2023-08-21 NOTE — DISCHARGE INSTRUCTIONS
Strep and COVID are negative. Continue using over the counter medications for your symptoms.  See your doctor for recheck if no improvement

## 2023-09-23 ENCOUNTER — OFFICE VISIT (OUTPATIENT)
Dept: FAMILY MEDICINE CLINIC | Facility: CLINIC | Age: 23
End: 2023-09-23

## 2023-09-23 VITALS
HEIGHT: 62 IN | DIASTOLIC BLOOD PRESSURE: 82 MMHG | WEIGHT: 169 LBS | SYSTOLIC BLOOD PRESSURE: 112 MMHG | BODY MASS INDEX: 31.1 KG/M2 | HEART RATE: 88 BPM

## 2023-09-23 DIAGNOSIS — F41.9 ANXIETY: ICD-10-CM

## 2023-09-23 DIAGNOSIS — Z30.44 ENCOUNTER FOR SURVEILLANCE OF VAGINAL RING HORMONAL CONTRACEPTIVE DEVICE: Primary | ICD-10-CM

## 2023-09-23 PROCEDURE — 3074F SYST BP LT 130 MM HG: CPT | Performed by: NURSE PRACTITIONER

## 2023-09-23 PROCEDURE — 3079F DIAST BP 80-89 MM HG: CPT | Performed by: NURSE PRACTITIONER

## 2023-09-23 PROCEDURE — 3008F BODY MASS INDEX DOCD: CPT | Performed by: NURSE PRACTITIONER

## 2023-09-23 PROCEDURE — 99213 OFFICE O/P EST LOW 20 MIN: CPT | Performed by: NURSE PRACTITIONER

## 2023-09-23 RX ORDER — ETONOGESTREL AND ETHINYL ESTRADIOL 11.7; 2.7 MG/1; MG/1
INSERT, EXTENDED RELEASE VAGINAL
Qty: 3 RING | Refills: 3 | Status: SHIPPED | OUTPATIENT
Start: 2023-09-23

## 2023-09-23 RX ORDER — SERTRALINE HYDROCHLORIDE 25 MG/1
25 TABLET, FILM COATED ORAL DAILY
Qty: 30 TABLET | Refills: 1 | Status: SHIPPED | OUTPATIENT
Start: 2023-09-23

## 2023-10-01 ENCOUNTER — PATIENT MESSAGE (OUTPATIENT)
Dept: FAMILY MEDICINE CLINIC | Facility: CLINIC | Age: 23
End: 2023-10-01

## 2023-10-02 NOTE — TELEPHONE ENCOUNTER
Pt had a televisit 09/29 for anxiety. Please advise if ok to generate letter and for how long she can be off work.

## 2023-10-23 ENCOUNTER — TELEPHONE (OUTPATIENT)
Dept: FAMILY MEDICINE CLINIC | Facility: CLINIC | Age: 23
End: 2023-10-23

## 2023-10-23 NOTE — TELEPHONE ENCOUNTER
Current Outpatient Medications:     buPROPion  MG Oral Tablet 24 Hr, Take 1 tablet (150 mg total) by mouth daily. , Disp: 30 tablet, Rfl: 1      Requesting qty 90

## 2023-10-25 DIAGNOSIS — F41.9 ANXIETY: ICD-10-CM

## 2023-10-25 NOTE — TELEPHONE ENCOUNTER
buPROPion  MG Oral Tablet 24 Hr (Discontinued) 30 tablet 1 9/29/2023 10/24/2023   Sig:   Take 1 tablet (150 mg total) by mouth daily.      Route:   Oral     Order #:   [id number]

## 2023-10-26 RX ORDER — BUPROPION HYDROCHLORIDE 150 MG/1
150 TABLET ORAL DAILY
Qty: 90 TABLET | Refills: 0 | OUTPATIENT
Start: 2023-10-26

## 2023-10-31 ENCOUNTER — IMMUNIZATION (OUTPATIENT)
Dept: FAMILY MEDICINE CLINIC | Facility: CLINIC | Age: 23
End: 2023-10-31

## 2023-10-31 DIAGNOSIS — Z23 NEED FOR VACCINATION: Primary | ICD-10-CM

## 2023-10-31 PROCEDURE — 90686 IIV4 VACC NO PRSV 0.5 ML IM: CPT | Performed by: NURSE PRACTITIONER

## 2023-10-31 PROCEDURE — 90471 IMMUNIZATION ADMIN: CPT | Performed by: NURSE PRACTITIONER

## 2023-11-22 DIAGNOSIS — F41.9 ANXIETY: ICD-10-CM

## 2023-11-22 RX ORDER — BUPROPION HYDROCHLORIDE 150 MG/1
150 TABLET ORAL DAILY
Qty: 30 TABLET | Refills: 1 | OUTPATIENT
Start: 2023-11-22

## 2024-03-21 ENCOUNTER — TELEPHONE (OUTPATIENT)
Dept: FAMILY MEDICINE CLINIC | Facility: CLINIC | Age: 24
End: 2024-03-21

## 2024-03-21 NOTE — TELEPHONE ENCOUNTER
Pt is requesting labs per therapist regarding anxiety meds that will work for her. Please advise [phone]

## 2024-03-21 NOTE — TELEPHONE ENCOUNTER
Called and spoke with patient.  Interested in possibly restarting anxiety medications.  Follow up appt scheduled.

## 2024-06-26 ENCOUNTER — HOSPITAL ENCOUNTER (OUTPATIENT)
Age: 24
Discharge: HOME | End: 2024-06-26

## 2024-06-26 VITALS
OXYGEN SATURATION: 100 % | SYSTOLIC BLOOD PRESSURE: 118 MMHG | HEART RATE: 75 BPM | DIASTOLIC BLOOD PRESSURE: 66 MMHG | TEMPERATURE: 98 F | RESPIRATION RATE: 20 BRPM

## 2024-06-26 DIAGNOSIS — N30.00 ACUTE CYSTITIS WITHOUT HEMATURIA: Primary | ICD-10-CM

## 2024-06-26 DIAGNOSIS — Z32.01 POSITIVE PREGNANCY TEST (HCC): ICD-10-CM

## 2024-06-26 DIAGNOSIS — N89.8 VAGINAL ITCHING: ICD-10-CM

## 2024-06-26 DIAGNOSIS — B37.31 VAGINAL YEAST INFECTION: ICD-10-CM

## 2024-06-26 LAB
B-HCG UR QL: POSITIVE
CLARITY UR: CLEAR
GLUCOSE UR STRIP-MCNC: NEGATIVE MG/DL
HGB UR QL STRIP: NEGATIVE
NITRITE UR QL STRIP: NEGATIVE
PH UR STRIP: 5.5 [PH]
PROT UR STRIP-MCNC: NEGATIVE MG/DL
SP GR UR STRIP: >=1.03
UROBILINOGEN UR STRIP-ACNC: <2 MG/DL

## 2024-06-26 PROCEDURE — 99213 OFFICE O/P EST LOW 20 MIN: CPT | Performed by: NURSE PRACTITIONER

## 2024-06-26 PROCEDURE — 81002 URINALYSIS NONAUTO W/O SCOPE: CPT | Performed by: NURSE PRACTITIONER

## 2024-06-26 PROCEDURE — 81025 URINE PREGNANCY TEST: CPT | Performed by: NURSE PRACTITIONER

## 2024-06-26 RX ORDER — CEPHALEXIN 500 MG/1
500 CAPSULE ORAL 3 TIMES DAILY
Qty: 15 CAPSULE | Refills: 0 | Status: SHIPPED | OUTPATIENT
Start: 2024-06-26 | End: 2024-07-01

## 2024-06-26 RX ORDER — CLOTRIMAZOLE 1 %
1 CREAM WITH APPLICATOR VAGINAL DAILY
Qty: 45 G | Refills: 0 | Status: SHIPPED | OUTPATIENT
Start: 2024-06-26 | End: 2024-07-03

## 2024-06-26 NOTE — ED PROVIDER NOTES
Patient Seen in: [facility]      History     Chief Complaint   Patient presents with    Dysuria    Vaginal Problem     Stated Complaint: eval-g, abdominal discomfort    Subjective:   24-year-old female with anxiety presents from home with complaints of dysuria, vaginal itching.  Symptoms for about 3 days.  No vaginal discharge.  No fever.  Denies risk for STD.  She is not wearing condoms but no recent partner changes.  Denies abdominal or back pain.  No vomiting.    The history is provided by the patient. No  was used.       Objective:   No pertinent past medical history.            No pertinent past surgical history.              No pertinent social history.            Review of Systems    Positive for stated Chief Complaint: Dysuria and Vaginal Problem    Other systems are as noted in HPI.  Constitutional and vital signs reviewed.      All other systems reviewed and negative except as noted above.    Physical Exam     ED Triage Vitals [06/26/24 0918]   /66   Pulse 75   Resp 20   Temp 97.7 °F (36.5 °C)   Temp src Temporal   SpO2 100 %   O2 Device None (Room air)       Current Vitals:   Vital Signs  BP: 118/66  Pulse: 75  Resp: 20  Temp: 97.7 °F (36.5 °C)  Temp src: Temporal    Oxygen Therapy  SpO2: 100 %  O2 Device: None (Room air)            Physical Exam  Vitals and nursing note reviewed. Exam conducted with a chaperone present (Orin DEL REAL).   Constitutional:       General: She is not in acute distress.     Appearance: Normal appearance. She is not ill-appearing or toxic-appearing.   HENT:      Head: Normocephalic and atraumatic.      Nose: Nose normal.      Mouth/Throat:      Mouth: Mucous membranes are moist.      Pharynx: Oropharynx is clear.   Eyes:      Pupils: Pupils are equal, round, and reactive to light.   Cardiovascular:      Rate and Rhythm: Normal rate.      Pulses: Normal pulses.   Pulmonary:      Effort: Pulmonary effort is normal. No respiratory distress.    Abdominal:      General: Abdomen is flat.      Palpations: Abdomen is soft.      Tenderness: There is no abdominal tenderness. There is no right CVA tenderness or left CVA tenderness.   Genitourinary:     Vagina: Vaginal discharge (scant white thin) present. No bleeding.      Cervix: Normal. No cervical motion tenderness.      Adnexa:         Right: No tenderness.          Left: No tenderness.     Musculoskeletal:         General: No signs of injury. Normal range of motion.      Cervical back: Normal range of motion and neck supple.   Skin:     General: Skin is warm and dry.      Capillary Refill: Capillary refill takes less than 2 seconds.   Neurological:      General: No focal deficit present.      Mental Status: She is alert and oriented to person, place, and time.      GCS: GCS eye subscore is 4. GCS verbal subscore is 5. GCS motor subscore is 6.   Psychiatric:         Mood and Affect: Mood normal.         Behavior: Behavior normal.         Thought Content: Thought content normal.         Judgment: Judgment normal.         ED Course     Labs Reviewed   [facility] POCT URINALYSIS DIPSTICK - Abnormal; Notable for the following components:       Result Value    Ketone, Urine Trace (*)     Bilirubin, Urine Small (*)     Leukocyte esterase urine Small (*)     All other components within normal limits   POCT PREGNANCY URINE - Abnormal; Notable for the following components:    POCT Urine Pregnancy Positive (*)     All other components within normal limits   URINE CULTURE, ROUTINE   CHLAMYDIA/GONOCOCCUS, JULIÁN   VAGINITIS VAGINOSIS PCR PANEL     Recent Results (from the past 24 hour(s))   POCT Urinalysis Dipstick    Collection Time: 06/26/24  9:20 AM   Result Value Ref Range    Urine Color Dark yellow Yellow    Urine Clarity Clear Clear    Specific Gravity, Urine >=1.030 1.005 - 1.030    PH, Urine 5.5 5.0 - 8.0    Protein urine Negative Negative mg/dL    Glucose, Urine Negative Negative mg/dL    Ketone, Urine Trace (A)  Negative mg/dL    Bilirubin, Urine Small (A) Negative    Blood, Urine Negative Negative    Nitrite Urine Negative Negative    Urobilinogen urine <2.0 <2.0 mg/dL    Leukocyte esterase urine Small (A) Negative   POCT Pregnancy, Urine    Collection Time: 24  9:29 AM   Result Value Ref Range    POCT Urine Pregnancy Positive (A) Negative     MDM      Medical Decision Making  Differential diagnoses reflecting the complexity of care include: UTI, STD, yeast infection  UA here is consistent with infection with small leuks, small bilirubin, trace ketones, elevated specific gravity.  Urine culture was sent  Pelvic exam reveals very scant amount of white discharge, nonspecific.  She is having vaginal itching which could indicate yeast infection.  Vaginal and STD cultures were collected.  States low risk for STD and declined prophylactic treatment  Pregnancy test here is positive.  Patient states this is unplanned.  She is taking birth control, has the ring.  Does note that she is slightly off her schedule, she put the NuvaRing in about 5 days late.  She has not been pregnant in the past.  States she plans on terminating this pregnancy.  I did discuss medication  with the patient and recommend follow[facility] at [facility].      Plan of Care:Keflex for UTI.  Vaginal clotrimazole for presumed yeast infection.  Patient to follow[facility] with [facility].  Otherwise she should follow-up with gynecology, referral provided    Results and plan of care discussed with the patient/family. They are in agreement with discharge. They understand to follow up with their primary doctor or the referral physician for further evaluation, especially if no improvement.  Also discussed the limitations of immediate care, patient is aware that if symptoms are worse they should go to the emergency room. Verbal and written discharge instructions were given.         Problems Addressed:  Acute cystitis without hematuria: acute illness  or injury  Positive pregnancy test (HCC): acute illness or injury  Vaginal itching: acute illness or injury  Vaginal yeast infection: acute illness or injury    Amount and/or Complexity of Data Reviewed  External Data Reviewed: labs.     Details: Most recent urine culture in 2020 grew Enterobacter cloacae - resistant to cefazolin. Negative STD testing 4/23  Labs: ordered. Decision-making details documented in ED Course.    Risk  OTC drugs.  Prescription drug management.        Disposition and Plan     Clinical Impression:  1. Acute cystitis without hematuria    2. Vaginal yeast infection    3. Positive pregnancy test (HCC)    4. Vaginal itching         Disposition:  Discharge  6/26/2024 10:13 am    Follow-up:  Nerissa Ochoa, SUJATA  [address]  [address]  [facility] [id number] [phone]          Shabbir Wilder,   [address] [facility]  [address]  [facility] [id number] [phone]                Medications Prescribed:  Discharge Medication List as of 6/26/2024 10:19 AM        START taking these medications    Details   clotrimazole ( CLOTRIMAZOLE VAGINAL) 1 % Vaginal Cream Place 1 Applicatorful vaginally daily for 7 days., Normal, Disp-45 g, R-0      cephalexin 500 MG Oral Cap Take 1 capsule (500 mg total) by mouth 3 (three) times daily for 5 days., Normal, Disp-15 capsule, R-0

## 2024-06-26 NOTE — ED INITIAL ASSESSMENT (HPI)
Patient with complaint of 3 days of dysuria  Vaginal itching   No concern for STD   No discharge   No fever   Recently changed soaps at home

## 2024-06-26 NOTE — DISCHARGE INSTRUCTIONS
Pregnancy test is positive.  Schedule follow-up with gynecology.  You are being treated for presumed urine infection and vaginal yeast infection.  Take the antibiotic orally 3 times a day until gone.  Insert the vaginal suppository daily as directed.  Do this at night, wear a panty liner during the day as some of the medicine will leak out.  Urine and vaginal cultures are pending.  Results will be available in your MyChart in about 48 hours.  We will call you with any positive results or to discuss any treatment changes necessary.

## 2024-06-27 LAB
BV BACTERIA DNA VAG QL NAA+PROBE: NEGATIVE
C GLABRATA DNA VAG QL NAA+PROBE: NEGATIVE
C KRUSEI DNA VAG QL NAA+PROBE: NEGATIVE
C TRACH DNA SPEC QL NAA+PROBE: NEGATIVE
CANDIDA DNA VAG QL NAA+PROBE: POSITIVE
N GONORRHOEA DNA SPEC QL NAA+PROBE: NEGATIVE
T VAGINALIS DNA VAG QL NAA+PROBE: NEGATIVE

## 2024-07-03 ENCOUNTER — TELEMEDICINE (OUTPATIENT)
Dept: FAMILY MEDICINE CLINIC | Facility: CLINIC | Age: 24
End: 2024-07-03
Payer: COMMERCIAL

## 2024-07-03 DIAGNOSIS — Z32.01 POSITIVE PREGNANCY TEST (HCC): Primary | ICD-10-CM

## 2024-07-03 PROCEDURE — 99213 OFFICE O/P EST LOW 20 MIN: CPT | Performed by: NURSE PRACTITIONER

## 2024-07-03 NOTE — PROGRESS NOTES
HPI    Virtual Telephone/Video Check-In    Kyle Sprague verbally consents to a Virtual/Telephone Check-In visit on 07/03/24.  Patient has been referred to the [facility] website at [email or web address] to review the yearly Consent to Treat document.    Patient understands and accepts financial responsibility for any deductible, co-insurance and/or co-pays associated with this service.    Duration of the service: 10 minutes      Summary of topics discussed:     Patient presents for UC follow up.  Had a positive UCG. LMP 6/5/24.  Patient with intent [facility] with [facility] at 6 weeks gestation.      Had sex in May and then again 2 weeks ago .  Has been on the Nuva Ring for 5 years and never had any issues with it.  Recently started Wellbutrin in May.  Unsure if there was an interaction which may have caused her birth control to be ineffective.  Patient requesting an hCG to determine if she may have conceived earlier than in June.    Review of Systems   All other systems reviewed and are negative.       There were no vitals filed for this visit.  There is no height or weight on file to calculate BMI.    Health Maintenance   Topic Date Due   • COVID-19 Vaccine (3 - 2023-24 season) 09/01/2023   • Annual Physical  10/27/2023   • Annual Depression Screening  01/01/2024   • Influenza Vaccine (1) 10/01/2024   • Chlamydia Screening  06/26/2025   • Pap Smear  04/26/2026   • DTaP,Tdap,and Td Vaccines (8 - Td or Tdap) 10/27/2032   • HPV Vaccines  Completed   • Pneumococcal Vaccine: Birth to 64yrs  Aged Out       Patient's last menstrual period was 06/01/2024 (exact date).    Past Medical History:   • Anxiety   • Scoliosis       .History reviewed. No pertinent surgical history.    History reviewed. No pertinent family history.    Social History     Socioeconomic History   • Marital status: Single     Spouse name: Not on file   • Number of children: Not on file   • Years of education: Not on file   • Highest  education level: Not on file   Occupational History   • Not on file   Tobacco Use   • Smoking status: Never     Passive exposure: Yes   • Smokeless tobacco: Never   • Tobacco comments:     Father smkes outside    Vaping Use   • Vaping status: Never Used   Substance and Sexual Activity   • Alcohol use: Yes     Comment: occ   • Drug use: No   • Sexual activity: Not on file   Other Topics Concern   • Not on file   Social History Narrative   • Not on file     Social Determinants of Health     Financial Resource Strain: Not on file   Food Insecurity: Not on file   Transportation Needs: Not on file   Physical Activity: Not on file   Stress: Not on file   Social Connections: Unknown (3/13/2021)    [facility]    Social Connections    • Conversations with friends/family/neighbors per week: Not on file   Housing Stability: Low Risk  (7/7/2021)    [facility], [facility]    Housing Stability    • Mortgage Payment Concerns?: Not on file    • Number of Places Lived in the Last Year: Not on file    • Unstable Housing?: Not on file       Current Outpatient Medications   Medication Sig Dispense Refill   • clotrimazole (SM CLOTRIMAZOLE VAGINAL) 1 % Vaginal Cream Place 1 Applicatorful vaginally daily for 7 days. 45 g 0   • ondansetron (ZOFRAN) 4 mg tablet Take 1 tablet (4 mg total) by mouth every 8 (eight) hours as needed for Nausea. 20 tablet 2   • buPROPion  MG Oral Tablet 24 Hr Take 1 tablet (150 mg total) by mouth daily. 90 tablet 1   • hydrOXYzine 10 MG Oral Tab Take one to two tablets by mouth every 6 hours as needed for anxiety 60 tablet 1   • Etonogestrel-Ethinyl Estradiol (NUVARING) 0.12-0.015 MG/24HR Vaginal Ring Place 1 ring vaginally every 28 days 3 Ring 3       Allergies:  No Known Allergies    Physical Exam  Constitutional:       Appearance: Normal appearance.   Pulmonary:      Effort: No  respiratory distress.   Neurological:      Mental Status: She is alert and oriented to person, place, and time.        Assessment and Plan:  Problem List Items Addressed This Visit    None  Visit Diagnoses       Positive pregnancy test (HCC)    -  Primary           Informed patient that quantitative hCG ranges are so broad that it would not give us an accurate reading to differentiate between a 3 to 4-week range and a 5 to 6-week range.  Patient decided that she would hold off for [facility] in [facility] weeks.  Patient to discuss interactions with pharmacy for NuvaRing and Wellbutrin since there should be no interactions and/or side effect to determine how to go forward for contraception.       Discussed plan of care with patient and patient is in agreement.  All questions answered. Patient to call with questions or concerns.    Encouraged to sign up for My Chart if not already registered.

## 2024-07-09 ENCOUNTER — APPOINTMENT (OUTPATIENT)
Dept: ULTRASOUND IMAGING | Facility: HOSPITAL | Age: 24
End: 2024-07-09
Attending: NURSE PRACTITIONER
Payer: COMMERCIAL

## 2024-07-09 ENCOUNTER — HOSPITAL ENCOUNTER (EMERGENCY)
Facility: HOSPITAL | Age: 24
Discharge: HOME | End: 2024-07-09
Payer: COMMERCIAL

## 2024-07-09 VITALS
OXYGEN SATURATION: 100 % | TEMPERATURE: 99 F | WEIGHT: 175.94 LBS | RESPIRATION RATE: 20 BRPM | DIASTOLIC BLOOD PRESSURE: 68 MMHG | BODY MASS INDEX: 32 KG/M2 | SYSTOLIC BLOOD PRESSURE: 131 MMHG | HEART RATE: 99 BPM

## 2024-07-09 DIAGNOSIS — O03.4 INCOMPLETE MISCARRIAGE (HCC): Primary | ICD-10-CM

## 2024-07-09 LAB
ALBUMIN SERPL-MCNC: 4.2 G/DL (ref 3.2–4.8)
ALBUMIN/GLOB SERPL: 1.7 {RATIO} (ref 1–2)
ALP LIVER SERPL-CCNC: 101 U/L
ALT SERPL-CCNC: 56 U/L
ANION GAP SERPL CALC-SCNC: 4 MMOL/L (ref 0–18)
AST SERPL-CCNC: 35 U/L (ref ?–34)
B-HCG SERPL-ACNC: 1068.9 MIU/ML
B-HCG UR QL: POSITIVE
BASOPHILS # BLD AUTO: 0.02 X10(3) UL (ref 0–0.2)
BASOPHILS NFR BLD AUTO: 0.3 %
BILIRUB SERPL-MCNC: 0.7 MG/DL (ref 0.3–1.2)
BILIRUB UR QL: NEGATIVE
BUN BLD-MCNC: 7 MG/DL (ref 9–23)
BUN/CREAT SERPL: 9.3 (ref 10–20)
CALCIUM BLD-MCNC: 9.1 MG/DL (ref 8.7–10.4)
CHLORIDE SERPL-SCNC: 109 MMOL/L (ref 98–112)
CLARITY UR: CLEAR
CO2 SERPL-SCNC: 26 MMOL/L (ref 21–32)
COLOR UR: YELLOW
CREAT BLD-MCNC: 0.75 MG/DL
DEPRECATED RDW RBC AUTO: 39.8 FL (ref 35.1–46.3)
EGFRCR SERPLBLD CKD-EPI 2021: 115 ML/MIN/1.73M2 (ref 60–?)
EOSINOPHIL # BLD AUTO: 0.04 X10(3) UL (ref 0–0.7)
EOSINOPHIL NFR BLD AUTO: 0.6 %
ERYTHROCYTE [DISTWIDTH] IN BLOOD BY AUTOMATED COUNT: 12.3 % (ref 11–15)
GLOBULIN PLAS-MCNC: 2.5 G/DL (ref 2–3.5)
GLUCOSE BLD-MCNC: 92 MG/DL (ref 70–99)
GLUCOSE UR-MCNC: NORMAL MG/DL
HCT VFR BLD AUTO: 40.8 %
HGB BLD-MCNC: 14.8 G/DL
IMM GRANULOCYTES # BLD AUTO: 0.03 X10(3) UL (ref 0–1)
IMM GRANULOCYTES NFR BLD: 0.4 %
KETONES UR-MCNC: NEGATIVE MG/DL
LEUKOCYTE ESTERASE UR QL STRIP.AUTO: NEGATIVE
LYMPHOCYTES # BLD AUTO: 1.95 X10(3) UL (ref 1–4)
LYMPHOCYTES NFR BLD AUTO: 27.4 %
MCH RBC QN AUTO: 32.1 PG (ref 26–34)
MCHC RBC AUTO-ENTMCNC: 36.3 G/DL (ref 31–37)
MCV RBC AUTO: 88.5 FL
MONOCYTES # BLD AUTO: 0.43 X10(3) UL (ref 0.1–1)
MONOCYTES NFR BLD AUTO: 6 %
NEUTROPHILS # BLD AUTO: 4.65 X10 (3) UL (ref 1.5–7.7)
NEUTROPHILS # BLD AUTO: 4.65 X10(3) UL (ref 1.5–7.7)
NEUTROPHILS NFR BLD AUTO: 65.3 %
NITRITE UR QL STRIP.AUTO: NEGATIVE
OSMOLALITY SERPL CALC.SUM OF ELEC: 286 MOSM/KG (ref 275–295)
PH UR: 7 [PH] (ref 5–8)
PLATELET # BLD AUTO: 257 10(3)UL (ref 150–450)
POTASSIUM SERPL-SCNC: 4 MMOL/L (ref 3.5–5.1)
PROT SERPL-MCNC: 6.7 G/DL (ref 5.7–8.2)
RBC # BLD AUTO: 4.61 X10(6)UL
RH BLOOD TYPE: POSITIVE
SODIUM SERPL-SCNC: 139 MMOL/L (ref 136–145)
SP GR UR STRIP: 1.02 (ref 1–1.03)
UROBILINOGEN UR STRIP-ACNC: 2
WBC # BLD AUTO: 7.1 X10(3) UL (ref 4–11)

## 2024-07-09 PROCEDURE — 99284 EMERGENCY DEPT VISIT MOD MDM: CPT

## 2024-07-09 PROCEDURE — 86901 BLOOD TYPING SEROLOGIC RH(D): CPT

## 2024-07-09 PROCEDURE — 85025 COMPLETE CBC W/AUTO DIFF WBC: CPT

## 2024-07-09 PROCEDURE — 84702 CHORIONIC GONADOTROPIN TEST: CPT

## 2024-07-09 PROCEDURE — 81025 URINE PREGNANCY TEST: CPT

## 2024-07-09 PROCEDURE — 76801 OB US < 14 WKS SINGLE FETUS: CPT | Performed by: NURSE PRACTITIONER

## 2024-07-09 PROCEDURE — 76817 TRANSVAGINAL US OBSTETRIC: CPT | Performed by: NURSE PRACTITIONER

## 2024-07-09 PROCEDURE — 86900 BLOOD TYPING SEROLOGIC ABO: CPT

## 2024-07-09 PROCEDURE — 80053 COMPREHEN METABOLIC PANEL: CPT

## 2024-07-09 PROCEDURE — 36415 COLL VENOUS BLD VENIPUNCTURE: CPT

## 2024-07-09 PROCEDURE — 81001 URINALYSIS AUTO W/SCOPE: CPT

## 2024-07-09 NOTE — ED PROVIDER NOTES
Patient Seen in: [facility] Emergency Department      History     Chief Complaint   Patient presents with    Pregnancy Issues     Stated Complaint: eval-g    Subjective:   23yo/f w no chronic medical problems reports to the ED w c/o vaginal bleeding x 1 day. Reports LMP 1 mo ago. Reports she was seen at an IC 3 weeks ago for a yeast infection and had a positive pregnancy and removed her nuva ring. No urinary symptoms. No flank pain. No chest pain. No focal deficits. Nothing makes better or worse.             Objective:   Past Medical History:    Anxiety    Scoliosis              History reviewed. No pertinent surgical history.             Social History     Socioeconomic History    Marital status: Single   Tobacco Use    Smoking status: Never     Passive exposure: Yes    Smokeless tobacco: Never    Tobacco comments:     Father smkes outside    Vaping Use    Vaping status: Never Used   Substance and Sexual Activity    Alcohol use: Yes     Comment: occ    Drug use: No     Social Determinants of Health      [facility]    Social Connections    Received from [facility], [facility]    Housing Stability              Review of Systems   All other systems reviewed and are negative.      Positive for stated Chief Complaint: Pregnancy Issues    Other systems are as noted in HPI.  Constitutional and vital signs reviewed.      All other systems reviewed and negative except as noted above.    Physical Exam     ED Triage Vitals [07/09/24 1132]   /84   Pulse 101   Resp 22   Temp 98.5 °F (36.9 °C)   Temp src Temporal   SpO2 100 %   O2 Device None (Room air)       Current Vitals:   Vital Signs  BP: 133/84  Pulse: 101  Resp: 22  Temp: 98.5 °F (36.9 °C)  Temp src: Temporal    Oxygen Therapy  SpO2: 100 %  O2 Device: None (Room air)            Physical Exam  Vitals and nursing note reviewed.   Constitutional:        General: She is not in acute distress.     Appearance: She is well-developed.   HENT:      Head: Normocephalic and atraumatic.      Nose: Nose normal.      Mouth/Throat:      Mouth: Mucous membranes are moist.   Eyes:      Conjunctiva/sclera: Conjunctivae normal.      Pupils: Pupils are equal, round, and reactive to light.   Cardiovascular:      Rate and Rhythm: Normal rate and regular rhythm.      Heart sounds: Normal heart sounds.   Pulmonary:      Effort: Pulmonary effort is normal.      Breath sounds: Normal breath sounds.   Abdominal:      General: Bowel sounds are normal.      Palpations: Abdomen is soft.   Musculoskeletal:         General: No tenderness or deformity. Normal range of motion.      Cervical back: Normal range of motion and neck supple.   Skin:     General: Skin is warm and dry.      Capillary Refill: Capillary refill takes less than 2 seconds.      Findings: No rash.      Comments: Normal color   Neurological:      General: No focal deficit present.      Mental Status: She is alert and oriented to person, place, and time.      GCS: GCS eye subscore is 4. GCS verbal subscore is 5. GCS motor subscore is 6.      Cranial Nerves: No cranial nerve deficit.      Gait: Gait normal.         =      ED Course     Labs Reviewed   URINALYSIS, ROUTINE - Abnormal; Notable for the following components:       Result Value    Blood Urine 3+ (*)     Protein Urine Trace (*)     Urobilinogen Urine 2 (*)     Squamous Epi. Cells Few (*)     All other components within normal limits   COMP METABOLIC PANEL (14) - Abnormal; Notable for the following components:    BUN 7 (*)     BUN/CREA Ratio 9.3 (*)     ALT 56 (*)     AST 35 (*)     All other components within normal limits   HCG, BETA SUBUNIT (QUANT PREGNANCY TEST) - Abnormal; Notable for the following components:    Hcg Quantitative 1,068.9 (*)     All other components within normal limits   POCT PREGNANCY URINE - Abnormal; Notable for the following components:     POCT Urine Pregnancy Positive (*)     All other components within normal limits   CBC WITH DIFFERENTIAL WITH PLATELET    Narrative:     The following orders were created for panel order CBC With Differential With Platelet.  Procedure                               Abnormality         Status                     ---------                               -----------         ------                     CBC W/ DIFFERENTIAL[[id number]]                              Final result                 Please view results for these tests on the individual orders.   ABORH (BLOOD TYPE)   CBC W/ DIFFERENTIAL          ED Course as of 07/09/24 1446  ------------------------------------------------------------  Time: 07/09 1435  Comment: Steve appoint; come at 1130am.               [facility]                                      Medical Decision Making  23yo/f w hx and exam as stated; bleeding preg    1,000 quant  O+  No fever  No vomiting  Overall stable  Hgb wnl    Us with retained products  Discussed w Dr. Wilson will see at 1130am tomorrow      Amount and/or Complexity of Data Reviewed  Labs:  Decision-making details documented in ED Course.  Radiology:  Decision-making details documented in ED Course.    Risk  OTC drugs.  Prescription drug management.        Disposition and Plan     Clinical Impression:  1. Incomplete miscarriage (HCC)         Disposition:  Discharge  7/9/2024  2:46 pm    Follow-up:  Rasta Wilson  [address] [address]  [phone]    Follow up  tomorrow at 1130am          Medications Prescribed:  Current Discharge Medication List

## 2024-07-09 NOTE — ED INITIAL ASSESSMENT (HPI)
Pt to ED with c/o vaginal bleeding. Pt states + pregnancy test at home 3 weeks ago. Pt states got pregnant with Nuvaring in place. Pt states Nuvaring currently removed. Pt denies cramping. Denies chest pain. No respiratory distress noted. Pt is alert and oriented x4. Pt skin parameters WNL. Pt ambulating by self with steady gait.   LMP 2024 per patient.

## 2024-07-10 ENCOUNTER — OFFICE VISIT (OUTPATIENT)
Dept: OBGYN CLINIC | Facility: CLINIC | Age: 24
End: 2024-07-10

## 2024-07-10 VITALS
HEIGHT: 62 IN | DIASTOLIC BLOOD PRESSURE: 80 MMHG | BODY MASS INDEX: 29.63 KG/M2 | WEIGHT: 161 LBS | SYSTOLIC BLOOD PRESSURE: 111 MMHG

## 2024-07-10 DIAGNOSIS — O03.9 COMPLETE ABORTION (HCC): Primary | ICD-10-CM

## 2024-07-10 PROCEDURE — 99213 OFFICE O/P EST LOW 20 MIN: CPT | Performed by: OBSTETRICS & GYNECOLOGY

## 2024-07-10 NOTE — PROGRESS NOTES
Kyle Sprague is a 24 year old female  Patient's last menstrual period was 2024 (exact date).   Chief Complaint   Patient presents with    Follow - Up     Pt was seen at the ER for vaginal bleeding and presents for incomplete miscarriage   Pt to er yesterday had heavy bleeding with clots. After the ultrasound yesterday passed more tissue.     OBSTETRICS HISTORY:     OB History    Para Term  AB Living   1       1     SAB IAB Ectopic Multiple Live Births   1              # Outcome Date GA Lbr Shahram/2nd Weight Sex Type Anes PTL Lv   1 SAB 24               GYNE HISTORY:     Hx Prior Abnormal Pap: Yes  Pap Date: 23  Pap Result Notes: ASCUS   Menarche: 16 y/o (7/10/2024 11:36 AM)  Period Cycle (Days): 28-30 (7/10/2024 11:36 AM)  Period Duration (Days): 4 (7/10/2024 11:36 AM)  Period Flow: light (7/10/2024 11:36 AM)  Use of Birth Control (if yes, specify type): None (7/10/2024 11:36 AM)  Date When Birth Control Last Used: Nuvaring removed 2024 (7/10/2024 11:36 AM)  Hx Prior Abnormal Pap: Yes (7/10/2024 11:36 AM)  Pap Date: 23 (7/10/2024 11:36 AM)  Pap Result Notes: ASCUS (7/10/2024 11:36 AM)        Latest Ref Rng & Units 2023     1:36 PM   RECENT PAP RESULTS   Thinprep Pap Negative for intraepithelial lesion or malignancy Atypical squamous cells of undetermined significance (ASC-US)    HPV Negative Negative          MEDICAL HISTORY:     Past Medical History:    Anxiety    Scoliosis       SURGICAL HISTORY:     History reviewed. No pertinent surgical history.    SOCIAL HISTORY:     Social History     Socioeconomic History    Marital status: Single   Tobacco Use    Smoking status: Never     Passive exposure: Yes    Smokeless tobacco: Never    Tobacco comments:     Father smkes outside    Vaping Use    Vaping status: Never Used   Substance and Sexual Activity    Alcohol use: Yes     Comment: occ    Drug use: No     Social Determinants of Health      [facility], [facility]    Housing Stability        FAMILY HISTORY:     Family History   Problem Relation Age of Onset    No Known Problems Father     No Known Problems Mother        MEDICATIONS:       Current Outpatient Medications:     buPROPion  MG Oral Tablet 24 Hr, Take 1 tablet (150 mg total) by mouth daily., Disp: 90 tablet, Rfl: 1    hydrOXYzine 10 MG Oral Tab, Take one to two tablets by mouth every 6 hours as needed for anxiety, Disp: 60 tablet, Rfl: 1    ondansetron (ZOFRAN) 4 mg tablet, Take 1 tablet (4 mg total) by mouth every 8 (eight) hours as needed for Nausea. (Patient not taking: Reported on 7/9/2024), Disp: 20 tablet, Rfl: 2    Etonogestrel-Ethinyl Estradiol (NUVARING) 0.12-0.015 MG/24HR Vaginal Ring, Place 1 ring vaginally every 28 days (Patient not taking: Reported on 7/9/2024), Disp: 3 Ring, Rfl: 3    ALLERGIES:     No Known Allergies      REVIEW OF SYSTEMS:     Constitutional:    denies fever / chills  Eyes:     denies blurred or double vision  Cardiovascular:  denies chest pain or palpitations  Respiratory:    denies shortness of breath  Gastrointestinal:  denies severe abdominal pain, frequent diarrhea or constipation, nausea / vomiting  Genitourinary:    denies dysuria, bothersome incontinence  Skin/Breast:   denies any breast pain, lumps, or discharge  Neurological:    denies frequent severe headaches  Psychiatric:   denies depression or anxiety, thoughts of harming self or others  Heme/Lymph:    denies easy bruising or bleeding      PHYSICAL EXAM:   Blood pressure 111/80, height 5' 2\" (1.575 m), weight 161 lb (73 kg), last menstrual period 06/01/2024, not currently breastfeeding.  Constitutional:  well developed, well nourished  Head/Face:  normocephalic  Neck/Thyroid: thyroid symmetric, no thyromegaly, no nodules, no adenopathy  Lymphatic: no abnormal supraclavicular or axillary adenopathy is noted  Respiratory:      chest wall symmetric and  nontender on palpation, clear to asculation bilateral, no wheezing, rales, ronchi, and resonance normal upon percussion  Cardiovascular: chest normal in appearance, regular rate and rhythm, no murmurs, PMI palpated midclavicular line  Breast:   normal bilaterally without palpable masses, tenderness, asymmetry, nipple discharge, nipple retraction or skin changes bilaterally  Abdomen:   soft, nontender, nondistended, no masses  Skin/Hair:  no unusual rashes or bruises  Extremities:  no edema, no cyanosis, non tender bilaterally  Psychiatric:   oriented to time, place, person and situation. Appropriate mood and affect    Pelvic Exam:  External Genitalia:  normal appearance, hair distribution, and no lesions  Urethral Meatus:   normal in size, location, without lesions   Bladder:    no fullness, masses or tenderness  Vagina:    normal appearance without lesions, 3x3 cm tissue mass seen in vault and removed, os closed and no active bleeding. Old dark blood visualized, minimal.   Cervix:    No lesions, normal friability   Uterus:    normal in size, 8 wk sized, normal contour, position, mobility, without  motion tenderness  Adnexa:   normal without masses or tenderness  Perineum:   normal  Anus: no hemorroids         ASSESSMENT & PLAN:     Kyle was seen today for follow - up.    Diagnoses and all orders for this visit:    Complete  (HCC)  -     HCG, Beta Subunit (Quant Pregnancy Test); Future  The patient had heavy bleeding and I believe this is consistent with a complete  miscarriage.  I believe that the mass that was seen in the lower uterine segment yesterday passed as I visualize it in the vault today and removed it.  I recommend a repeat hCG in 3 weeks.  It should be close to 0 at that time.  I recommend to return in 4 weeks for an annual exam and Pap smear and contraceptive counseling if desired and patient agreed with the plan.  I also offered another hCG today but the patient declined as she is  pretty certain that this is a miscarriage and I agree with that promise.        FOLLOW-UP     Return in about 4 weeks (around 8/7/2024) for Annual exam.      Rasta Wilson MD  7/10/2024

## 2024-07-18 ENCOUNTER — TELEPHONE (OUTPATIENT)
Dept: OBGYN UNIT | Facility: HOSPITAL | Age: 24
End: 2024-07-18

## 2024-08-07 ENCOUNTER — OFFICE VISIT (OUTPATIENT)
Dept: OBGYN CLINIC | Facility: CLINIC | Age: 24
End: 2024-08-07
Payer: COMMERCIAL

## 2024-08-07 VITALS
HEIGHT: 62 IN | WEIGHT: 164 LBS | HEART RATE: 103 BPM | SYSTOLIC BLOOD PRESSURE: 121 MMHG | DIASTOLIC BLOOD PRESSURE: 81 MMHG | BODY MASS INDEX: 30.18 KG/M2

## 2024-08-07 DIAGNOSIS — Z30.09 CONTRACEPTIVE USE EDUCATION: Primary | ICD-10-CM

## 2024-08-07 LAB
CONTROL LINE PRESENT WITH A CLEAR BACKGROUND (YES/NO): YES YES/NO
KIT LOT #: NORMAL NUMERIC
PREGNANCY TEST, URINE: NEGATIVE

## 2024-08-07 PROCEDURE — 99213 OFFICE O/P EST LOW 20 MIN: CPT | Performed by: OBSTETRICS & GYNECOLOGY

## 2024-08-07 PROCEDURE — 81025 URINE PREGNANCY TEST: CPT | Performed by: OBSTETRICS & GYNECOLOGY

## 2024-08-07 RX ORDER — ETONOGESTREL AND ETHINYL ESTRADIOL VAGINAL RING .015; .12 MG/D; MG/D
1 RING VAGINAL
Qty: 3 EACH | Refills: 4 | Status: SHIPPED | OUTPATIENT
Start: 2024-08-07 | End: 2024-08-08

## 2024-08-07 NOTE — PROGRESS NOTES
Kyle Sprague is a 24 year old female  Patient's last menstrual period was 2024 (exact date).   Chief Complaint   Patient presents with    Follow - Up   For upt f/u sab. Pt wants nuvaring. Upt negative today  OBSTETRICS HISTORY:     OB History    Para Term  AB Living   1       1     SAB IAB Ectopic Multiple Live Births   1              # Outcome Date GA Lbr Shahram/2nd Weight Sex Type Anes PTL Lv   1 SAB 24 6w0d              GYNE HISTORY:         Menarche: 16 y/o (7/10/2024 11:36 AM)  Period Cycle (Days): 28-30 (7/10/2024 11:36 AM)  Period Duration (Days): 4 (7/10/2024 11:36 AM)  Period Flow: light (7/10/2024 11:36 AM)  Use of Birth Control (if yes, specify type): None (7/10/2024 11:36 AM)  Date When Birth Control Last Used: Nuvaring removed 2024 (7/10/2024 11:36 AM)  Hx Prior Abnormal Pap: Yes (7/10/2024 11:36 AM)  Pap Date: 24 (7/10/2024 11:36 AM)  Pap Result Notes: ASCUS (7/10/2024 11:36 AM)        Latest Ref Rng & Units 2023     1:36 PM   RECENT PAP RESULTS   Thinprep Pap Negative for intraepithelial lesion or malignancy Atypical squamous cells of undetermined significance (ASC-US)    HPV Negative Negative          MEDICAL HISTORY:     Past Medical History:    Anxiety    Scoliosis       SURGICAL HISTORY:     History reviewed. No pertinent surgical history.    SOCIAL HISTORY:     Social History     Socioeconomic History    Marital status: Single   Tobacco Use    Smoking status: Never     Passive exposure: Yes    Smokeless tobacco: Never    Tobacco comments:     Father smkes outside    Vaping Use    Vaping status: Never Used   Substance and Sexual Activity    Alcohol use: Yes     Comment: occ    Drug use: No     Social Determinants of Health      [facility], [facility]    Housing Stability        FAMILY HISTORY:     Family History   Problem Relation Age of Onset    No Known Problems Father     No Known  Problems Mother        MEDICATIONS:       Current Outpatient Medications:     Etonogestrel-Ethinyl Estradiol (NUVARING) 0.12-0.015 MG/24HR Vaginal Ring, Place 1 Ring vaginally every 28 days for 1 dose., Disp: 3 each, Rfl: 4    buPROPion  MG Oral Tablet 24 Hr, Take 1 tablet (150 mg total) by mouth daily., Disp: 90 tablet, Rfl: 1    ondansetron (ZOFRAN) 4 mg tablet, Take 1 tablet (4 mg total) by mouth every 8 (eight) hours as needed for Nausea. (Patient not taking: Reported on 7/9/2024), Disp: 20 tablet, Rfl: 2    hydrOXYzine 10 MG Oral Tab, Take one to two tablets by mouth every 6 hours as needed for anxiety (Patient not taking: Reported on 8/7/2024), Disp: 60 tablet, Rfl: 1    Etonogestrel-Ethinyl Estradiol (NUVARING) 0.12-0.015 MG/24HR Vaginal Ring, Place 1 ring vaginally every 28 days (Patient not taking: Reported on 7/9/2024), Disp: 3 Ring, Rfl: 3    ALLERGIES:     No Known Allergies      REVIEW OF SYSTEMS:     Constitutional:    denies fever / chills  Eyes:     denies blurred or double vision  Cardiovascular:  denies chest pain or palpitations  Respiratory:    denies shortness of breath  Gastrointestinal:  denies severe abdominal pain, frequent diarrhea or constipation, nausea / vomiting  Genitourinary:    denies dysuria, bothersome incontinence  Skin/Breast:   denies any breast pain, lumps, or discharge  Neurological:    denies frequent severe headaches  Psychiatric:   denies depression or anxiety, thoughts of harming self or others  Heme/Lymph:    denies easy bruising or bleeding      PHYSICAL EXAM:   Blood pressure 121/81, pulse 103, height 5' 2\" (1.575 m), weight 164 lb (74.4 kg), last menstrual period 06/01/2024, not currently breastfeeding.  Constitutional:  well developed, well nourished      ASSESSMENT & PLAN:     yKle was seen today for follow - up.    Diagnoses and all orders for this visit:    Contraceptive use education  -     Etonogestrel-Ethinyl Estradiol (NUVARING) 0.12-0.015 MG/24HR  Vaginal Ring; Place 1 Ring vaginally every 28 days for 1 dose.    Contraceptive counseling given.  Efficacy, side effects, risks and benefits of oral contraceptive pills, NuvaRing, Depo-Provera, intrauterine device, patch, Nexplanon, abstinence, condoms, and permanent sterilization were discussed with the patient and patient verbalized understanding and all questions were answered.  I advised condoms or abstinence as backup during the first month of contraception use for pregnancy prevention and I advised condom use at all times for HPV and STD prevention if clinically appropriate.   Pres nuvaring, use d/w pt  I spent 20 minutes face-to-face with the patient, over half of the time in discussion and counseling of the diagnosis, work-up steps, and potential treatment plans.  This time was also spent for chart review including obtaining and/or reviewing additional medical history, prior labs and radiology testing as well as documenting clinical information in the electronic medical record, independently interpreting results and communicating results to the patient and/or family and coordinating care.     FOLLOW-UP     Return in about 1 year (around 8/7/2025) for Annual exam.      Rasta Wilson MD  8/7/2024

## 2024-08-20 DIAGNOSIS — F41.9 ANXIETY: ICD-10-CM

## 2024-08-21 NOTE — TELEPHONE ENCOUNTER
Refill passed per [facility] protocol. Ok for refill?  LOV 24 notes:   Patient to discuss interactions with pharmacy for NuvaRing and Wellbutrin since there should be no interactions and/or side effect to determine how to go forward for contraception.       Requested Prescriptions   Pending Prescriptions Disp Refills    buPROPion  MG Oral Tablet 24 Hr 90 tablet 1     Sig: Take 1 tablet (150 mg total) by mouth daily.       Psychiatric Non-Scheduled (Anti-Anxiety) Passed - 2024 11:39 AM        Passed - In person appointment or virtual visit in the past 6 mos or appointment in next 3 mos     Recent Outpatient Visits              2 weeks ago Contraceptive use education    [facility] includes the [facility] - OB/GYN Rasta Wilson MD    Office Visit    1 month ago Complete  (HCC)    Count includes the [facility] - OB/GYN Rasta Wilson MD    Office Visit    1 month ago [facility]    3 months [facility]    [facility] Phone E/M    4 months ago [facility]                      Passed - Depression Screening completed within the past 12 months

## 2024-08-22 RX ORDER — BUPROPION HYDROCHLORIDE 150 MG/1
150 TABLET ORAL DAILY
Qty: 90 TABLET | Refills: 1 | Status: SHIPPED | OUTPATIENT
Start: 2024-08-22

## 2024-10-16 ENCOUNTER — OFFICE VISIT (OUTPATIENT)
Dept: FAMILY MEDICINE CLINIC | Facility: CLINIC | Age: 24
End: 2024-10-16

## 2024-10-16 ENCOUNTER — LAB ENCOUNTER (OUTPATIENT)
Dept: LAB | Age: 24
End: 2024-10-16
Attending: NURSE PRACTITIONER
Payer: COMMERCIAL

## 2024-10-16 VITALS
WEIGHT: 167 LBS | DIASTOLIC BLOOD PRESSURE: 76 MMHG | HEART RATE: 103 BPM | SYSTOLIC BLOOD PRESSURE: 111 MMHG | HEIGHT: 62 IN | BODY MASS INDEX: 30.73 KG/M2

## 2024-10-16 DIAGNOSIS — Z00.00 WELL ADULT EXAM: Primary | ICD-10-CM

## 2024-10-16 DIAGNOSIS — F41.9 ANXIETY: ICD-10-CM

## 2024-10-16 DIAGNOSIS — Z00.00 WELL ADULT EXAM: ICD-10-CM

## 2024-10-16 DIAGNOSIS — Z23 ENCOUNTER FOR ADMINISTRATION OF VACCINE: ICD-10-CM

## 2024-10-16 DIAGNOSIS — E55.9 VITAMIN D DEFICIENCY: ICD-10-CM

## 2024-10-16 LAB
ALBUMIN SERPL-MCNC: 4.5 G/DL (ref 3.2–4.8)
ALBUMIN/GLOB SERPL: 1.7 {RATIO} (ref 1–2)
ALP LIVER SERPL-CCNC: 92 U/L
ALT SERPL-CCNC: 15 U/L
ANION GAP SERPL CALC-SCNC: 8 MMOL/L (ref 0–18)
AST SERPL-CCNC: 17 U/L (ref ?–34)
BASOPHILS # BLD AUTO: 0.04 X10(3) UL (ref 0–0.2)
BASOPHILS NFR BLD AUTO: 0.7 %
BILIRUB SERPL-MCNC: 0.6 MG/DL (ref 0.3–1.2)
BUN BLD-MCNC: 10 MG/DL (ref 9–23)
BUN/CREAT SERPL: 12.3 (ref 10–20)
CALCIUM BLD-MCNC: 9.3 MG/DL (ref 8.7–10.4)
CHLORIDE SERPL-SCNC: 108 MMOL/L (ref 98–112)
CHOLEST SERPL-MCNC: 223 MG/DL (ref ?–200)
CO2 SERPL-SCNC: 26 MMOL/L (ref 21–32)
CREAT BLD-MCNC: 0.81 MG/DL
DEPRECATED RDW RBC AUTO: 41.4 FL (ref 35.1–46.3)
EGFRCR SERPLBLD CKD-EPI 2021: 105 ML/MIN/1.73M2 (ref 60–?)
EOSINOPHIL # BLD AUTO: 0.09 X10(3) UL (ref 0–0.7)
EOSINOPHIL NFR BLD AUTO: 1.5 %
ERYTHROCYTE [DISTWIDTH] IN BLOOD BY AUTOMATED COUNT: 12.6 % (ref 11–15)
FASTING PATIENT LIPID ANSWER: YES
FASTING STATUS PATIENT QL REPORTED: YES
GLOBULIN PLAS-MCNC: 2.6 G/DL (ref 2–3.5)
GLUCOSE BLD-MCNC: 83 MG/DL (ref 70–99)
HCT VFR BLD AUTO: 41.9 %
HDLC SERPL-MCNC: 81 MG/DL (ref 40–59)
HGB BLD-MCNC: 14.7 G/DL
IMM GRANULOCYTES # BLD AUTO: 0.02 X10(3) UL (ref 0–1)
IMM GRANULOCYTES NFR BLD: 0.3 %
LDLC SERPL CALC-MCNC: 130 MG/DL (ref ?–100)
LYMPHOCYTES # BLD AUTO: 1.99 X10(3) UL (ref 1–4)
LYMPHOCYTES NFR BLD AUTO: 33 %
MCH RBC QN AUTO: 31.5 PG (ref 26–34)
MCHC RBC AUTO-ENTMCNC: 35.1 G/DL (ref 31–37)
MCV RBC AUTO: 89.7 FL
MONOCYTES # BLD AUTO: 0.4 X10(3) UL (ref 0.1–1)
MONOCYTES NFR BLD AUTO: 6.6 %
NEUTROPHILS # BLD AUTO: 3.49 X10 (3) UL (ref 1.5–7.7)
NEUTROPHILS # BLD AUTO: 3.49 X10(3) UL (ref 1.5–7.7)
NEUTROPHILS NFR BLD AUTO: 57.9 %
NONHDLC SERPL-MCNC: 142 MG/DL (ref ?–130)
OSMOLALITY SERPL CALC.SUM OF ELEC: 292 MOSM/KG (ref 275–295)
PLATELET # BLD AUTO: 253 10(3)UL (ref 150–450)
POTASSIUM SERPL-SCNC: 4.1 MMOL/L (ref 3.5–5.1)
PROT SERPL-MCNC: 7.1 G/DL (ref 5.7–8.2)
RBC # BLD AUTO: 4.67 X10(6)UL
SODIUM SERPL-SCNC: 142 MMOL/L (ref 136–145)
TRIGL SERPL-MCNC: 70 MG/DL (ref 30–149)
TSI SER-ACNC: 3.93 MIU/ML (ref 0.55–4.78)
VIT B12 SERPL-MCNC: 429 PG/ML (ref 211–911)
VIT D+METAB SERPL-MCNC: 11.9 NG/ML (ref 30–100)
VLDLC SERPL CALC-MCNC: 13 MG/DL (ref 0–30)
WBC # BLD AUTO: 6 X10(3) UL (ref 4–11)

## 2024-10-16 PROCEDURE — 99395 PREV VISIT EST AGE 18-39: CPT | Performed by: NURSE PRACTITIONER

## 2024-10-16 PROCEDURE — 36415 COLL VENOUS BLD VENIPUNCTURE: CPT

## 2024-10-16 PROCEDURE — 90656 IIV3 VACC NO PRSV 0.5 ML IM: CPT | Performed by: NURSE PRACTITIONER

## 2024-10-16 PROCEDURE — 85025 COMPLETE CBC W/AUTO DIFF WBC: CPT

## 2024-10-16 PROCEDURE — 82306 VITAMIN D 25 HYDROXY: CPT

## 2024-10-16 PROCEDURE — 82607 VITAMIN B-12: CPT

## 2024-10-16 PROCEDURE — 90471 IMMUNIZATION ADMIN: CPT | Performed by: NURSE PRACTITIONER

## 2024-10-16 PROCEDURE — 84443 ASSAY THYROID STIM HORMONE: CPT

## 2024-10-16 PROCEDURE — 80061 LIPID PANEL: CPT

## 2024-10-16 PROCEDURE — 80053 COMPREHEN METABOLIC PANEL: CPT

## 2024-10-16 NOTE — PROGRESS NOTES
HPI    Patient presents for annual physical.  Positive for past medical history; anxiety, well controlled.      Gyne history - .    Last pap - 2023, abnormal.  12 month recall.  Will refer to gyne to repeat.    LMP - 10/15/2024.    Birth control - Nuvaring.   Diet - diet is healthy.  Exercise - exercises regularly.   Immunizations - flu vaccine today.      Review of Systems   All other systems reviewed and are negative.       Vitals:    10/16/24 0840   BP: 111/76   Pulse: 103   Weight: 167 lb (75.8 kg)   Height: 5' 2\" (1.575 m)     Body mass index is 30.54 kg/m².    Health Maintenance   Topic Date Due    Annual Physical  10/27/2023    COVID-19 Vaccine (3 - 2023-24 season) 2024    Influenza Vaccine (1) 10/01/2024    Chlamydia Screening  2025    Pap Smear  2026    DTaP,Tdap,and Td Vaccines (8 - Td or Tdap) 10/27/2032    Annual Depression Screening  Completed    HPV Vaccines  Completed    Pneumococcal Vaccine: Birth to 64yrs  Aged Out       Patient's last menstrual period was 10/15/2024 (exact date).    Past Medical History:    Anxiety    Scoliosis       .History reviewed. No pertinent surgical history.    Family History   Problem Relation Age of Onset    No Known Problems Father     No Known Problems Mother        Social History     Socioeconomic History    Marital status: Single     Spouse name: Not on file    Number of children: Not on file    Years of education: Not on file    Highest education level: Not on file   Occupational History    Not on file   Tobacco Use    Smoking status: Never     Passive exposure: Yes    Smokeless tobacco: Never    Tobacco comments:     Father smkes outside    Vaping Use    Vaping status: Never Used   Substance and Sexual Activity    Alcohol use: Yes     Comment: occ    Drug use: No    Sexual activity: Not on file   Other Topics Concern    Not on file   Social History Narrative    Not on file     Social Drivers of Health     Financial Resource Strain: Not on  file   Food Insecurity: Not on file   Transportation Needs: Not on file   Stress: Not on file   Housing Stability: Low Risk  (7/7/2021)    [facility]    Housing Stability     Mortgage Payment Concerns?: Not on file     Number of Places Lived in the Last Year: Not on file     Unstable Housing?: Not on file       Current Outpatient Medications   Medication Sig Dispense Refill    buPROPion  MG Oral Tablet 24 Hr Take 1 tablet (150 mg total) by mouth daily. 90 tablet 1    Etonogestrel-Ethinyl Estradiol (NUVARING) 0.12-0.015 MG/24HR Vaginal Ring Place 1 ring vaginally every 28 days 3 Ring 3       Allergies:  Allergies[1]    Physical Exam  Vitals and nursing note reviewed.   Constitutional:       General: She is not in acute distress.     Appearance: Normal appearance. She is well-developed. She is not ill-appearing, toxic-appearing or diaphoretic.   HENT:      Head: Normocephalic and atraumatic.      Right Ear: Hearing, tympanic membrane, ear canal and external ear normal. There is no impacted cerumen.      Left Ear: Hearing, tympanic membrane, ear canal and external ear normal. There is no impacted cerumen.      Nose: Nose normal. No congestion.      Mouth/Throat:      Mouth: Mucous membranes are moist.      Pharynx: Oropharynx is clear. No oropharyngeal exudate or posterior oropharyngeal erythema.   Eyes:      General:         Right eye: No discharge.         Left eye: No discharge.      Conjunctiva/sclera: Conjunctivae normal.   Neck:      Thyroid: No thyromegaly.   Cardiovascular:      Rate and Rhythm: Normal rate and regular rhythm.      Pulses: Normal pulses.      Heart sounds: Normal heart sounds. No murmur heard.  Pulmonary:      Effort: Pulmonary effort is normal. No respiratory distress.      Breath sounds: Normal breath sounds. No stridor. No wheezing, rhonchi or rales.   Chest:      Chest wall: No tenderness.   Abdominal:      General:  Abdomen is flat. Bowel sounds are normal. There is no distension.      Palpations: Abdomen is soft. There is no mass.      Tenderness: There is no abdominal tenderness. There is no guarding or rebound.      Hernia: No hernia is present.   Musculoskeletal:         General: Normal range of motion.      Cervical back: Normal range of motion and neck supple.   Lymphadenopathy:      Cervical: No cervical adenopathy.   Skin:     General: Skin is warm and dry.   Neurological:      Mental Status: She is alert and oriented to person, place, and time.   Psychiatric:         Mood and Affect: Mood normal.         Behavior: Behavior normal.         Thought Content: Thought content normal.         Judgment: Judgment normal.          Assessment and Plan:  Problem List Items Addressed This Visit    None  Visit Diagnoses       Well adult exam    -  Primary    Relevant Orders    Fluzone trivalent vaccine, PF 0.5mL, 6mo+ (61808)    Lipid Panel    Comp Metabolic Panel (14)    CBC With Differential With Platelet    TSH W Reflex To Free T4    Vitamin D    Vitamin B12    Anxiety        Vitamin D deficiency        Relevant Orders    Vitamin B12    Encounter for administration of vaccine        Relevant Orders    Fluzone trivalent vaccine, PF 0.5mL, 6mo+ (22003)           Fasting labs, flu vaccine today.       Discussed plan of care with patient and patient is in agreement.  All questions answered. Patient to call with questions or concerns.    Encouraged to sign up for My Chart if not already registered.          [1] No Known Allergies

## 2025-01-10 ENCOUNTER — PATIENT MESSAGE (OUTPATIENT)
Dept: FAMILY MEDICINE CLINIC | Facility: CLINIC | Age: 25
End: 2025-01-10

## 2025-01-10 ENCOUNTER — APPOINTMENT (OUTPATIENT)
Dept: GENERAL RADIOLOGY | Age: 25
End: 2025-01-10
Attending: NURSE PRACTITIONER
Payer: COMMERCIAL

## 2025-01-10 ENCOUNTER — HOSPITAL ENCOUNTER (OUTPATIENT)
Age: 25
Discharge: HOME | End: 2025-01-10
Payer: COMMERCIAL

## 2025-01-10 VITALS
RESPIRATION RATE: 20 BRPM | DIASTOLIC BLOOD PRESSURE: 76 MMHG | OXYGEN SATURATION: 97 % | TEMPERATURE: 98 F | SYSTOLIC BLOOD PRESSURE: 125 MMHG | HEART RATE: 75 BPM

## 2025-01-10 DIAGNOSIS — S92.912A: Primary | ICD-10-CM

## 2025-01-10 DIAGNOSIS — S92.902A CLOSED FRACTURE OF LEFT FOOT, INITIAL ENCOUNTER: Primary | ICD-10-CM

## 2025-01-10 PROCEDURE — 73630 X-RAY EXAM OF FOOT: CPT | Performed by: NURSE PRACTITIONER

## 2025-01-10 PROCEDURE — 99213 OFFICE O/P EST LOW 20 MIN: CPT | Performed by: NURSE PRACTITIONER

## 2025-01-10 PROCEDURE — 29515 APPLICATION SHORT LEG SPLINT: CPT | Performed by: NURSE PRACTITIONER

## 2025-01-10 PROCEDURE — E0114 CRUTCH UNDERARM PAIR NO WOOD: HCPCS | Performed by: NURSE PRACTITIONER
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
## 2025-01-10 NOTE — DISCHARGE INSTRUCTIONS
Please ice and elevate the foot. Tylenol or Motrin for pain. Monday morning call the foot doctor to schedule a follow up.
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
## 2025-01-11 RX ORDER — HYDROCODONE BITARTRATE AND ACETAMINOPHEN 5; 325 MG/1; MG/1
1 TABLET ORAL EVERY 6 HOURS PRN
Qty: 20 TABLET | Refills: 0 | Status: SHIPPED | OUTPATIENT
Start: 2025-01-11

## 2025-01-13 ENCOUNTER — OFFICE VISIT (OUTPATIENT)
Dept: PODIATRY CLINIC | Facility: CLINIC | Age: 25
End: 2025-01-13

## 2025-01-13 DIAGNOSIS — S93.325A LISFRANC DISLOCATION, LEFT, INITIAL ENCOUNTER: Primary | ICD-10-CM

## 2025-01-13 PROCEDURE — 99204 OFFICE O/P NEW MOD 45 MIN: CPT | Performed by: PODIATRIST

## 2025-01-13 RX ORDER — TRAMADOL HYDROCHLORIDE 50 MG/1
50 TABLET ORAL EVERY 12 HOURS PRN
Qty: 30 TABLET | Refills: 0 | Status: SHIPPED | OUTPATIENT
Start: 2025-01-13
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
## 2025-01-17 ENCOUNTER — HOSPITAL ENCOUNTER (OUTPATIENT)
Dept: CT IMAGING | Facility: HOSPITAL | Age: 25
Discharge: HOME | End: 2025-01-17
Attending: PODIATRIST
Payer: COMMERCIAL

## 2025-01-17 ENCOUNTER — TELEPHONE (OUTPATIENT)
Dept: PODIATRY CLINIC | Facility: CLINIC | Age: 25
End: 2025-01-17

## 2025-01-17 DIAGNOSIS — S93.325A LISFRANC DISLOCATION, LEFT, INITIAL ENCOUNTER: ICD-10-CM

## 2025-01-17 PROCEDURE — 73700 CT LOWER EXTREMITY W/O DYE: CPT | Performed by: PODIATRIST

## 2025-01-17 NOTE — TELEPHONE ENCOUNTER
Patient dropped forms for short term disability, forms are coming from [facility], filled out RC and paid fee

## 2025-01-20 ENCOUNTER — TELEPHONE (OUTPATIENT)
Dept: PODIATRY CLINIC | Facility: CLINIC | Age: 25
End: 2025-01-20

## 2025-01-20 NOTE — TELEPHONE ENCOUNTER
Spoke with patient and let her know that results were not released yet, but that we would be reaching out soon, as soon as we receive results and communication from provider with next steps.

## 2025-01-20 NOTE — TELEPHONE ENCOUNTER
Family Medical Leave Act forms dropped off at the [facility] office.    Forms emailed to forms dept, Release of Information included.    Originals sent via inter office mail.

## 2025-01-21 ENCOUNTER — TELEPHONE (OUTPATIENT)
Dept: PODIATRY CLINIC | Facility: CLINIC | Age: 25
End: 2025-01-21

## 2025-01-21 ENCOUNTER — VIRTUAL PHONE E/M (OUTPATIENT)
Dept: PODIATRY CLINIC | Facility: CLINIC | Age: 25
End: 2025-01-21

## 2025-01-21 DIAGNOSIS — S93.325A LISFRANC DISLOCATION, LEFT, INITIAL ENCOUNTER: Primary | ICD-10-CM

## 2025-01-21 PROCEDURE — 98014 SYNCH AUDIO-ONLY EST MOD 30: CPT | Performed by: PODIATRIST

## 2025-01-21 NOTE — TELEPHONE ENCOUNTER
Per Dr Ross, Ct results reviewed and would like to set up a televisit to discuss today.   Called patient Left message to call back. If patient call back please offer televisit today at 550pm with Dr ross.

## 2025-01-21 NOTE — TELEPHONE ENCOUNTER
----- Message from Charlie Zacarias sent at 8/20/2024 12:04 PM EDT -----  Pathology benign  Follow-up with PCP   Spoke with patient mother to schedule televisit today at 550pm with Dr ross.

## 2025-01-22 ENCOUNTER — OFFICE VISIT (OUTPATIENT)
Dept: ORTHOPEDICS CLINIC | Facility: CLINIC | Age: 25
End: 2025-01-22

## 2025-01-22 DIAGNOSIS — S93.325A LISFRANC DISLOCATION, LEFT, INITIAL ENCOUNTER: Primary | ICD-10-CM

## 2025-01-22 DIAGNOSIS — S92.342A CLOSED DISPLACED FRACTURE OF FOURTH METATARSAL BONE OF LEFT FOOT, INITIAL ENCOUNTER: ICD-10-CM

## 2025-01-22 DIAGNOSIS — S92.332A CLOSED DISPLACED FRACTURE OF THIRD METATARSAL BONE OF LEFT FOOT, INITIAL ENCOUNTER: ICD-10-CM

## 2025-01-22 DIAGNOSIS — S92.322A CLOSED DISPLACED FRACTURE OF SECOND METATARSAL BONE OF LEFT FOOT, INITIAL ENCOUNTER: ICD-10-CM

## 2025-01-22 PROCEDURE — 99204 OFFICE O/P NEW MOD 45 MIN: CPT | Performed by: ORTHOPAEDIC SURGERY

## 2025-01-22 RX ORDER — IBUPROFEN 600 MG/1
600 TABLET, FILM COATED ORAL EVERY 6 HOURS PRN
Qty: 60 TABLET | Refills: 1 | Status: SHIPPED | OUTPATIENT
Start: 2025-01-22

## 2025-01-22 NOTE — TELEPHONE ENCOUNTER
Called patient and scheduled appointment today at 245pm with Dr Alegria at [facility].

## 2025-01-22 NOTE — TELEPHONE ENCOUNTER
Reached out to Dr. Castaneda who agrees to have this patient be put on her schedule 1/22/2025.  Please schedule accordingly
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
## 2025-01-23 ENCOUNTER — TELEPHONE (OUTPATIENT)
Dept: ORTHOPEDICS CLINIC | Facility: CLINIC | Age: 25
End: 2025-01-23

## 2025-01-23 DIAGNOSIS — S93.325A LISFRANC DISLOCATION, LEFT, INITIAL ENCOUNTER: Primary | ICD-10-CM

## 2025-01-23 NOTE — TELEPHONE ENCOUNTER
Left message to inform patient that her surgery with Dr. Castaneda will be scheduled for Monday January 27, 2025.

## 2025-01-23 NOTE — TELEPHONE ENCOUNTER
Type of surgery:  Left foot open reduction internal fixation Lisfranc joint, open reduction internal fixation 2nd through 4th metatarsals, possible midfoot fusion, possible bone graft calcaneus   Date: 1/27/25  Location: [facility]  Medical Clearance: No     *Medical:      *Dental:      *Other:  Prior Authorization Status: Pending  Workers Comp:  [place]:  [place]: Yes  POV: 2/12/25

## 2025-01-24 NOTE — TELEPHONE ENCOUNTER
Received Family Medical Leave Act forms In the forms department without authorization. Sent [employer] message and logged for processing.

## 2025-01-24 NOTE — DISCHARGE INSTRUCTIONS
Dr. Castaneda's Discharge Instructions    Keep your splint clean, dry, and intact. Do not remove until you follow up in the office.     Ice and elevate your leg. You can place a bag full of ice directly on your splint. When you elevate your leg, put the pillow under your calf and leave your heel free.     Do not put weight on the operative leg. Use crutches, walker, or a knee scooter to help get around.    **If the splint feels too tight- LOOSEN the splint!** You may take off the ace bandage if necessary and reapply it looser. After surgery, you tend to swell which may make the splint feel too tight.    Call the office if you have increased pain/swelling that is not being managed by your pain medicine. You should also call the office if there is increased redness or swelling going beyond your dressing. If there is bleeding that is coming through your dressing, reinforce it with guaze bandages and an ace bandage. Please call the office with any questions.    You may take the hydrocodone for pain relief. This can be taken every 6 hours as needed for pain. If you have breakthrough pain and it's not time for your hydrocodone, you may take 600 mg of ibuprofen every 6 hours as well if you don't have any kidney problems or history of problems with NSAIDs.     You should take 81 mg of aspirin twice a day for 1 month after surgery to prevent blood clots.     You should schedule a follow up appointment for 2 weeks after surgery.       Call Dr. Castaneda's office at [phone] with any questions.         HOME INSTRUCTIONS  AMBSURG HOME CARE INSTRUCTIONS: POST-OP ANESTHESIA  The medication that you received for sedation or general anesthesia can last up to 24 hours. Your judgment and reflexes may be altered, even if you feel like your normal self.      We Recommend:   Do not drive any motor vehicle or bicycle   Avoid mowing the lawn, playing sports, or working with power tools/applicances (power saws, electric knives or mixers)    That you have someone stay with you on your first night home   Do not drink alcohol or take sleeping pills or tranquilizers   Do not sign legal documents within 24 hours of your procedure   If you had a nerve block for your surgery, take extra care not to put any pressure on your arm or hand for 24 hours    It is normal:  For you to have a sore throat if you had a breathing tube during surgery (while you were asleep!). The sore throat should get better within 48 hours. You can gargle with warm salt water (1/2 tsp in 4 oz warm water) or use a throat lozenge for comfort  To feel muscle aches or soreness especially in the abdomen, chest or neck. The achy feeling should go away in the next 24 hours  To feel weak, sleepy or \"wiped out\". Your should start feeling better in the next 24 hours.   To experience mild discomforts such as sore lip or tongue, headache, cramps, gas pains or a bloated feeling in your abdomen.   To experience mild back pain or soreness for a day or two if you had spinal or epidural anesthesia.   If you had laparoscopic surgery, to feel shoulder pain or discomfort on the day of surgery.   For some patients to have nausea after surgery/anesthesia    If you feel nausea or experience vomiting:   Try to move around less.   Eat less than usual or drink only liquids until the next morning   Nausea should resolve in about 24 hours    If you have a problem when you are at home:    Call your surgeons office   Discharge Instructions: After Your Surgery  You’ve just had surgery. During surgery, you were given medicine called anesthesia to keep you relaxed and free of pain. After surgery, you may have some pain or nausea. This is common. Here are some tips for feeling better and getting well after surgery.   Going home  Your healthcare provider will show you how to take care of yourself when you go home. They'll also answer your questions. Have an adult family member or friend drive you home. For the first 24  hours after your surgery:   Don't drive or use heavy equipment.  Don't make important decisions or sign legal papers.  Take medicines as directed.  Don't drink alcohol.  Have someone stay with you, if needed. They can watch for problems and help keep you safe.  Be sure to go to all follow-up visits with your healthcare provider. And rest after your surgery for as long as your provider tells you to.   Coping with pain  If you have pain after surgery, pain medicine will help you feel better. Take it as directed, before pain becomes severe. Also, ask your healthcare provider or pharmacist about other ways to control pain. This might be with heat, ice, or relaxation. And follow any other instructions your surgeon or nurse gives you.      Stay on schedule with your medicine.     Tips for taking pain medicine  To get the best relief possible, remember these points:   Pain medicines can upset your stomach. Taking them with a little food may help.  Most pain relievers taken by mouth need at least 20 to 30 minutes to start to work.  Don't wait till your pain becomes severe before you take your medicine. Try to time your medicine so that you can take it before starting an activity. This might be before you get dressed, go for a walk, or sit down for dinner.  Constipation is a common side effect of some pain medicines. Call your healthcare provider before taking any medicines such as laxatives or stool softeners to help ease constipation. Also ask if you should skip any foods. Drinking lots of fluids and eating foods such as fruits and vegetables that are high in fiber can also help. Remember, don't take laxatives unless your surgeon has prescribed them.  Drinking alcohol and taking pain medicine can cause dizziness and slow your breathing. It can even be deadly. Don't drink alcohol while taking pain medicine.  Pain medicine can make you react more slowly to things. Don't drive or run machinery while taking pain  medicine.  Your healthcare provider may tell you to take acetaminophen to help ease your pain. Ask them how much you're supposed to take each day. Acetaminophen or other pain relievers may interact with your prescription medicines or other over-the-counter (OTC) medicines. Some prescription medicines have acetaminophen and other ingredients in them. Using both prescription and OTC acetaminophen for pain can cause you to accidentally overdose. Read the labels on your OTC medicines with care. This will help you to clearly know the list of ingredients, how much to take, and any warnings. It may also help you not take too much acetaminophen. If you have questions or don't understand the information, ask your pharmacist or healthcare provider to explain it to you before you take the OTC medicine.   Managing nausea  Some people have an upset stomach (nausea) after surgery. This is often because of anesthesia, pain, or pain medicine, less movement of food in the stomach, or the stress of surgery. These tips will help you handle nausea and eat healthy foods as you get better. If you were on a special food plan before surgery, ask your healthcare provider if you should follow it while you get better. Check with your provider on how your eating should progress. It may depend on the surgery you had. These general tips may help:   Don't push yourself to eat. Your body will tell you when to eat and how much.  Start off with clear liquids and soup. They're easier to digest.  Next try semi-solid foods as you feel ready. These include mashed potatoes, applesauce, and gelatin.  Slowly move to solid foods. Don’t eat fatty, rich, or spicy foods at first.  Don't force yourself to have 3 large meals a day. Instead eat smaller amounts more often.  Take pain medicines with a small amount of solid food, such as crackers or toast. This helps prevent nausea.  When to call your healthcare provider  Call your healthcare provider right away if  you have any of these:   You still have too much pain, or the pain gets worse, after taking the medicine. The medicine may not be strong enough. Or there may be a complication from the surgery.  You feel too sleepy, dizzy, or groggy. The medicine may be too strong.  Side effects such as nausea or vomiting. Your healthcare provider may advise taking other medicines to .  Skin changes such as rash, itching, or hives. This may mean you have an allergic reaction. Your provider may advise taking other medicines.  The incision looks different (for instance, part of it opens up).  Bleeding or fluid leaking from the incision site, and weren't told to expect that.  Fever of 100.4°F (38°C) or higher, or as directed by your provider.  Call 911  Call 911 right away if you have:   Trouble breathing  Facial swelling    If you have obstructive sleep apnea   You were given anesthesia medicine during surgery to keep you comfortable and free of pain. After surgery, you may have more apnea spells because of this medicine and other medicines you were given. The spells may last longer than normal.    At home:  Keep using the continuous positive airway pressure (CPAP) device when you sleep. Unless your healthcare provider tells you not to, use it when you sleep, day or night. CPAP is a common device used to treat obstructive sleep apnea.  Talk with your provider before taking any pain medicine, muscle relaxants, or sedatives. Your provider will tell you about the possible dangers of taking these medicines.  Contact your provider if your sleeping changes a lot even when taking medicines as directed.  [employer] last reviewed this educational content on 10/1/2021  © 2638-9664 [employer]. All rights reserved. This information is not intended as a substitute for professional medical care. Always follow your healthcare professional's instructions.

## 2025-01-27 ENCOUNTER — ANESTHESIA EVENT (OUTPATIENT)
Dept: SURGERY | Facility: HOSPITAL | Age: 25
End: 2025-01-27
Payer: COMMERCIAL

## 2025-01-27 ENCOUNTER — APPOINTMENT (OUTPATIENT)
Dept: GENERAL RADIOLOGY | Facility: HOSPITAL | Age: 25
End: 2025-01-27
Attending: ORTHOPAEDIC SURGERY
Payer: COMMERCIAL

## 2025-01-27 ENCOUNTER — ANESTHESIA (OUTPATIENT)
Dept: SURGERY | Facility: HOSPITAL | Age: 25
End: 2025-01-27
Payer: COMMERCIAL

## 2025-01-27 ENCOUNTER — HOSPITAL ENCOUNTER (OUTPATIENT)
Facility: HOSPITAL | Age: 25
Setting detail: HOSPITAL OUTPATIENT SURGERY
Discharge: HOME | End: 2025-01-27
Attending: ORTHOPAEDIC SURGERY | Admitting: ORTHOPAEDIC SURGERY
Payer: COMMERCIAL

## 2025-01-27 VITALS
WEIGHT: 171 LBS | HEIGHT: 63 IN | DIASTOLIC BLOOD PRESSURE: 62 MMHG | RESPIRATION RATE: 16 BRPM | HEART RATE: 118 BPM | OXYGEN SATURATION: 100 % | SYSTOLIC BLOOD PRESSURE: 108 MMHG | TEMPERATURE: 98 F | BODY MASS INDEX: 30.3 KG/M2

## 2025-01-27 DIAGNOSIS — S93.325D LISFRANC DISLOCATION, LEFT, SUBSEQUENT ENCOUNTER: Primary | ICD-10-CM

## 2025-01-27 LAB — B-HCG UR QL: NEGATIVE

## 2025-01-27 PROCEDURE — 28615 REPAIR FOOT DISLOCATION: CPT | Performed by: ORTHOPAEDIC SURGERY

## 2025-01-27 PROCEDURE — 28485 OPTX METATARSAL FX EACH: CPT | Performed by: ORTHOPAEDIC SURGERY

## 2025-01-27 PROCEDURE — 73630 X-RAY EXAM OF FOOT: CPT | Performed by: ORTHOPAEDIC SURGERY

## 2025-01-27 PROCEDURE — 0SSL04Z REPOSITION LEFT TARSOMETATARSAL JOINT WITH INTERNAL FIXATION DEVICE, OPEN APPROACH: ICD-10-PCS | Performed by: ORTHOPAEDIC SURGERY

## 2025-01-27 DEVICE — LOCKING SCREW, T6
Type: IMPLANTABLE DEVICE | Site: FOOT | Status: FUNCTIONAL
Brand: VARIAX

## 2025-01-27 DEVICE — BONE SCREW, T6
Type: IMPLANTABLE DEVICE | Site: FOOT | Status: FUNCTIONAL
Brand: VARIAX

## 2025-01-27 DEVICE — LOCKING SCREW, FULLY THREADED,T8
Type: IMPLANTABLE DEVICE | Site: FOOT | Status: FUNCTIONAL
Brand: VARIAX

## 2025-01-27 DEVICE — CANNULATED SCREW
Type: IMPLANTABLE DEVICE | Site: FOOT | Status: FUNCTIONAL
Brand: ASNIS

## 2025-01-27 DEVICE — K-WIRE WITH STOP
Type: IMPLANTABLE DEVICE | Site: FOOT
Brand: VARIAX

## 2025-01-27 DEVICE — NARROW LOCKING T-PLATE, 2.0
Type: IMPLANTABLE DEVICE | Site: FOOT | Status: FUNCTIONAL
Brand: VARIAX

## 2025-01-27 DEVICE — WASHER: Type: IMPLANTABLE DEVICE | Site: FOOT | Status: FUNCTIONAL

## 2025-01-27 DEVICE — UNTHREADED GUIDE WIRE
Type: IMPLANTABLE DEVICE | Site: FOOT
Brand: FIXOS

## 2025-01-27 RX ORDER — HYDROMORPHONE HYDROCHLORIDE 1 MG/ML
0.8 INJECTION, SOLUTION INTRAMUSCULAR; INTRAVENOUS; SUBCUTANEOUS EVERY 2 HOUR PRN
Status: DISCONTINUED | OUTPATIENT
Start: 2025-01-27 | End: 2025-01-27

## 2025-01-27 RX ORDER — PROCHLORPERAZINE EDISYLATE 5 MG/ML
5 INJECTION INTRAMUSCULAR; INTRAVENOUS EVERY 8 HOURS PRN
Status: DISCONTINUED | OUTPATIENT
Start: 2025-01-27 | End: 2025-01-27

## 2025-01-27 RX ORDER — HYDROMORPHONE HYDROCHLORIDE 1 MG/ML
0.6 INJECTION, SOLUTION INTRAMUSCULAR; INTRAVENOUS; SUBCUTANEOUS EVERY 5 MIN PRN
Status: DISCONTINUED | OUTPATIENT
Start: 2025-01-27 | End: 2025-01-27

## 2025-01-27 RX ORDER — ONDANSETRON 2 MG/ML
INJECTION INTRAMUSCULAR; INTRAVENOUS AS NEEDED
Status: DISCONTINUED | OUTPATIENT
Start: 2025-01-27 | End: 2025-01-27 | Stop reason: SURG

## 2025-01-27 RX ORDER — HYDROMORPHONE HYDROCHLORIDE 1 MG/ML
0.4 INJECTION, SOLUTION INTRAMUSCULAR; INTRAVENOUS; SUBCUTANEOUS EVERY 2 HOUR PRN
Status: DISCONTINUED | OUTPATIENT
Start: 2025-01-27 | End: 2025-01-27

## 2025-01-27 RX ORDER — ROPIVACAINE HYDROCHLORIDE 5 MG/ML
INJECTION, SOLUTION EPIDURAL; INFILTRATION; PERINEURAL
Status: COMPLETED | OUTPATIENT
Start: 2025-01-27 | End: 2025-01-27

## 2025-01-27 RX ORDER — ONDANSETRON 2 MG/ML
4 INJECTION INTRAMUSCULAR; INTRAVENOUS EVERY 6 HOURS PRN
Status: DISCONTINUED | OUTPATIENT
Start: 2025-01-27 | End: 2025-01-27

## 2025-01-27 RX ORDER — HYDROMORPHONE HYDROCHLORIDE 1 MG/ML
0.4 INJECTION, SOLUTION INTRAMUSCULAR; INTRAVENOUS; SUBCUTANEOUS EVERY 5 MIN PRN
Status: DISCONTINUED | OUTPATIENT
Start: 2025-01-27 | End: 2025-01-27

## 2025-01-27 RX ORDER — DEXAMETHASONE SODIUM PHOSPHATE 4 MG/ML
VIAL (ML) INJECTION AS NEEDED
Status: DISCONTINUED | OUTPATIENT
Start: 2025-01-27 | End: 2025-01-27 | Stop reason: SURG

## 2025-01-27 RX ORDER — SODIUM CHLORIDE, SODIUM LACTATE, POTASSIUM CHLORIDE, CALCIUM CHLORIDE 600; 310; 30; 20 MG/100ML; MG/100ML; MG/100ML; MG/100ML
INJECTION, SOLUTION INTRAVENOUS CONTINUOUS
Status: DISCONTINUED | OUTPATIENT
Start: 2025-01-27 | End: 2025-01-27

## 2025-01-27 RX ORDER — MIDAZOLAM HYDROCHLORIDE 1 MG/ML
INJECTION INTRAMUSCULAR; INTRAVENOUS
Status: COMPLETED | OUTPATIENT
Start: 2025-01-27 | End: 2025-01-27

## 2025-01-27 RX ORDER — OXYCODONE HYDROCHLORIDE 5 MG/1
5 TABLET ORAL EVERY 4 HOURS PRN
Status: DISCONTINUED | OUTPATIENT
Start: 2025-01-27 | End: 2025-01-27

## 2025-01-27 RX ORDER — ASPIRIN 81 MG/1
81 TABLET ORAL 2 TIMES DAILY
Qty: 60 TABLET | Refills: 0 | Status: SHIPPED | OUTPATIENT
Start: 2025-01-27

## 2025-01-27 RX ORDER — NALOXONE HYDROCHLORIDE 0.4 MG/ML
0.08 INJECTION, SOLUTION INTRAMUSCULAR; INTRAVENOUS; SUBCUTANEOUS AS NEEDED
Status: DISCONTINUED | OUTPATIENT
Start: 2025-01-27 | End: 2025-01-27

## 2025-01-27 RX ORDER — HYDROCODONE BITARTRATE AND ACETAMINOPHEN 5; 325 MG/1; MG/1
1 TABLET ORAL EVERY 6 HOURS PRN
Qty: 24 TABLET | Refills: 0 | Status: SHIPPED | OUTPATIENT
Start: 2025-01-27

## 2025-01-27 RX ORDER — LIDOCAINE HYDROCHLORIDE 10 MG/ML
INJECTION, SOLUTION EPIDURAL; INFILTRATION; INTRACAUDAL; PERINEURAL AS NEEDED
Status: DISCONTINUED | OUTPATIENT
Start: 2025-01-27 | End: 2025-01-27 | Stop reason: SURG

## 2025-01-27 RX ORDER — ACETAMINOPHEN 500 MG
1000 TABLET ORAL ONCE
Status: COMPLETED | OUTPATIENT
Start: 2025-01-27 | End: 2025-01-27

## 2025-01-27 RX ORDER — DEXAMETHASONE SODIUM PHOSPHATE 10 MG/ML
INJECTION, SOLUTION INTRAMUSCULAR; INTRAVENOUS
Status: COMPLETED | OUTPATIENT
Start: 2025-01-27 | End: 2025-01-27

## 2025-01-27 RX ORDER — HYDROMORPHONE HYDROCHLORIDE 1 MG/ML
0.2 INJECTION, SOLUTION INTRAMUSCULAR; INTRAVENOUS; SUBCUTANEOUS EVERY 5 MIN PRN
Status: DISCONTINUED | OUTPATIENT
Start: 2025-01-27 | End: 2025-01-27

## 2025-01-27 RX ORDER — OXYCODONE HYDROCHLORIDE 5 MG/1
10 TABLET ORAL EVERY 4 HOURS PRN
Status: DISCONTINUED | OUTPATIENT
Start: 2025-01-27 | End: 2025-01-27

## 2025-01-27 RX ORDER — DOCUSATE SODIUM 100 MG/1
100 CAPSULE, LIQUID FILLED ORAL 2 TIMES DAILY PRN
Qty: 90 CAPSULE | Refills: 0 | Status: SHIPPED | OUTPATIENT
Start: 2025-01-27

## 2025-01-27 RX ADMIN — LIDOCAINE HYDROCHLORIDE 50 MG: 10 INJECTION, SOLUTION EPIDURAL; INFILTRATION; INTRACAUDAL; PERINEURAL at 14:14:00

## 2025-01-27 RX ADMIN — DEXAMETHASONE SODIUM PHOSPHATE 10 MG: 10 INJECTION, SOLUTION INTRAMUSCULAR; INTRAVENOUS at 13:56:00

## 2025-01-27 RX ADMIN — MIDAZOLAM HYDROCHLORIDE 2 MG: 1 INJECTION INTRAMUSCULAR; INTRAVENOUS at 13:47:00

## 2025-01-27 RX ADMIN — DEXAMETHASONE SODIUM PHOSPHATE 4 MG: 4 MG/ML VIAL (ML) INJECTION at 15:08:00

## 2025-01-27 RX ADMIN — DEXAMETHASONE SODIUM PHOSPHATE 10 MG: 10 INJECTION, SOLUTION INTRAMUSCULAR; INTRAVENOUS at 13:50:00

## 2025-01-27 RX ADMIN — ONDANSETRON 4 MG: 2 INJECTION INTRAMUSCULAR; INTRAVENOUS at 14:22:00

## 2025-01-27 RX ADMIN — ROPIVACAINE HYDROCHLORIDE 30 ML: 5 INJECTION, SOLUTION EPIDURAL; INFILTRATION; PERINEURAL at 13:50:00

## 2025-01-27 RX ADMIN — ROPIVACAINE HYDROCHLORIDE 30 ML: 5 INJECTION, SOLUTION EPIDURAL; INFILTRATION; PERINEURAL at 13:56:00

## 2025-01-27 RX ADMIN — SODIUM CHLORIDE, SODIUM LACTATE, POTASSIUM CHLORIDE, CALCIUM CHLORIDE: 600; 310; 30; 20 INJECTION, SOLUTION INTRAVENOUS at 13:45:00

## 2025-01-27 RX ADMIN — SODIUM CHLORIDE, SODIUM LACTATE, POTASSIUM CHLORIDE, CALCIUM CHLORIDE: 600; 310; 30; 20 INJECTION, SOLUTION INTRAVENOUS at 14:33:00

## 2025-01-27 NOTE — ANESTHESIA PROCEDURE NOTES
Peripheral Block    Date/Time: 1/27/2025 1:55 PM    Performed by: Steve Cooper MD  Authorized by: Steve Cooper MD      General Information and Staff    Start Time:  1/27/2025 1:55 PM  End Time:  1/27/2025 1:56 PM  Anesthesiologist:  Steve Cooper MD  Performed by:  Anesthesiologist  Patient Location:  PACU    Block Placement: Pre Induction  Site Identification: real time ultrasound guided    Block site/laterality marked before start: site marked  Reason for Block: at surgeon's request and post-op pain management    Preanesthetic Checklist: 2 patient identifers, IV checked, site marked, risks and benefits discussed, monitors and equipment checked, pre-op evaluation, timeout performed, anesthesia consent, sterile technique used, no prohibitive neurological deficits and no local skin infection at insertion site      Procedure Details    Patient Position:  Supine  Prep: ChloraPrep    Monitoring:  Heart rate, cardiac monitor, continuous pulse ox and blood pressure cuff  Block Type:  Adductor canal  Laterality:  Left  Injection Technique:  Single-shot    Needle    Needle Type:   Needle Gauge:  20 G  Needle Length:  100 mm  Needle Localization:  Ultrasound guidance  Reason for Ultrasound Use: appropriate spread of the medication was noted in real time and no ultrasound evidence of intravascular and/or intraneural injection            Assessment    Injection Assessment:  Good spread noted, incremental injection, local visualized surrounding nerve on ultrasound, low pressure, negative aspiration for heme, negative resistance and no pain on injection  Paresthesia Pain:  None  Heart Rate Change: Yes    - Patient tolerated block procedure well without evidence of immediate block related complications.     Medications  1/27/2025 1:55 PM  ropivacaine (NAROPIN) injection 0.5% - Infiltration   30 mL - 1/27/2025 1:56:00 PM  dexamethasone (DECADRON) PF injection 10 mg/ml - Injection   10 mg - 1/27/2025 1:56:00  PM    Additional Comments    After sciatic block placed, supine, frog-legged.  Cooperative, watching the whole time.  DyMynd Ultra 360 20G x 4\".  Watched.  Heart rate slowed as she calmed down. Did great.

## 2025-01-27 NOTE — ANESTHESIA POSTPROCEDURE EVALUATION
Patient: Kyle Sprague    Procedure Summary       Date: 01/27/25 Room / Location: [facility] MAIN OR  / [facility] MAIN OR    Anesthesia Start: 1410 Anesthesia Stop: 1721    Procedure: Left foot open reduction internal fixation Lisfranc joint, open reduction internal fixation third and fourth metatarsals (Left: Foot) Diagnosis:       Lisfranc dislocation, left, initial encounter      (Lisfranc dislocation, left, initial encounter [S93.325A])    Surgeons: Yaneth Castaneda DO Anesthesiologist: Keri Trinidad MD    Anesthesia Type: general ASA Status: 2            Anesthesia Type: general    Vitals Value Taken Time   /74 01/27/25 1712   Temp 97.9 01/27/25 1721   Pulse 126 01/27/25 1721   Resp 23 01/27/25 1721   SpO2 98 % 01/27/25 1721   Vitals shown include unfiled device data.    [facility] AN Post Evaluation:   Patient Evaluated in PACU  Patient Participation: complete - patient participated  Level of Consciousness: awake and alert  Pain Score: 0  Pain Management: adequate  Airway Patency:patent  Dental exam unchanged from preop  Yes    Cardiovascular Status: acceptable, hemodynamically stable and tachycardic  Respiratory Status: acceptable  Postoperative Hydration acceptable      Keri Trinidad MD  1/27/2025 5:21 PM

## 2025-01-27 NOTE — INTERVAL H&P NOTE
Pre-op Diagnosis: Lisfranc dislocation, left, initial encounter [S93.325A]    The above referenced H&P was reviewed by Yaneth Castaneda DO on 1/27/2025, the patient was examined and no significant changes have occurred in the patient's condition since the H&P was performed.  I discussed with the patient and/or legal representative the potential benefits, risks and side effects of this procedure; the likelihood of the patient achieving goals; and potential problems that might occur during recuperation.  I discussed reasonable alternatives to the procedure, including risks, benefits and side effects related to the alternatives and risks related to not receiving this procedure.  We will proceed with procedure as planned.

## 2025-01-27 NOTE — ANESTHESIA PREPROCEDURE EVALUATION
Anesthesia PreOp Note    HPI:     Kyle Sprague is a 24 year old female who presents for preoperative consultation requested by: Yaneth Castaneda DO    Date of Surgery: 1/27/2025    Procedure(s):  Left foot open reduction internal fixation Lisfranc joint, open reduction internal fixation second through fourth metatarsals, possible midfoot, possible bone graft calcaneus  Indication: Lisfranc dislocation, left, initial encounter [S93.325A]    Relevant Problems   No relevant active problems       NPO:                         History Review:  Patient Active Problem List    Diagnosis Date Noted    ASCUS of cervix with negative high risk HPV 06/05/2023    Screen for STD (sexually transmitted disease) 07/08/2020    Dermatitis 07/08/2020    Perineal folliculitis 11/12/2019    Scoliosis        Past Medical History:    Anxiety    Scoliosis       Past Surgical History:   Procedure Laterality Date    Patient denies any surgical history         Prescriptions Prior to Admission[1]  Current Medications and Prescriptions Ordered in Epic[2]    Allergies[3]    Family History   Problem Relation Age of Onset    No Known Problems Father     No Known Problems Mother      Social History     Socioeconomic History    Marital status: Single   Tobacco Use    Smoking status: Former     Types: Cigarettes     Passive exposure: Yes    Smokeless tobacco: Never    Tobacco comments:     Father smkes outside    Vaping Use    Vaping status: Never Used   Substance and Sexual Activity    Alcohol use: Yes     Comment: occ    Drug use: No       Available pre-op labs reviewed.             Vital Signs:  Body mass index is 29.23 kg/m².   height is 1.6 m (5' 3\") and weight is 74.8 kg (165 lb).   Vitals:    01/23/25 1206   Weight: 74.8 kg (165 lb)   Height: 1.6 m (5' 3\")        Anesthesia Evaluation     Patient summary reviewed and Nursing notes reviewed    No history of anesthetic complications   Airway   Mallampati: I  TM distance: >3 FB  Neck ROM: full   Dental      Pulmonary - normal exam    breath sounds clear to auscultation  Cardiovascular   Exercise tolerance: good    Rhythm: regular  Rate: normal    Neuro/Psych    (+)  neuromuscular disease, anxiety/panic attacks,        GI/Hepatic/Renal    (-) GERD    Endo/Other    Abdominal                  Anesthesia Plan:   ASA:  2  Plan:   General  Airway:  LMA  Post-op Pain Management: Popliteal block and Saphenous block  Plan Comments: Yosef with Sciatic block and Adductor canal block.  Preg neg.very anxious,consoled.  Scoliosis. Never had surgery before.   Hurt playing with brother.  Informed Consent Plan and Risks Discussed With:  Patient  Use of Blood Products Discussed With:  Patient      I have informed Kyle Sprague and/or legal guardian or family member of the nature of the anesthetic plan, benefits, risks including possible dental damage if relevant, major complications, and any alternative forms of anesthetic management.   All of the patient's questions were answered to the best of my ability. The patient desires the anesthetic management as planned.  Steve Cooper MD  1/27/2025 12:58 PM  Present on Admission:  **None**           [1]   Medications Prior to Admission   Medication Sig Dispense Refill Last Dose/Taking    ibuprofen 600 MG Oral Tab Take 1 tablet (600 mg total) by mouth every 6 (six) hours as needed for Pain. 60 tablet 1 1/22/2025    buPROPion  MG Oral Tablet 24 Hr Take 1 tablet (150 mg total) by mouth daily. (Patient taking differently: Take 1 tablet (150 mg total) by mouth every morning.) 90 tablet 1 Taking Differently    Etonogestrel-Ethinyl Estradiol (NUVARING) 0.12-0.015 MG/24HR Vaginal Ring Place 1 ring vaginally every 28 days 3 Ring 3    [2]   Current Facility-Administered Medications Ordered in Epic   Medication Dose Route Frequency Provider Last Rate Last Admin    lactated ringers infusion   Intravenous Continuous Yaneth Castaneda DO        acetaminophen (Tylenol Extra Strength) tab  1,000 mg  1,000 mg Oral Once Yaneth Castaneda DO        ceFAZolin (Ancef) 2g in 10mL IV syringe premix  2 g Intravenous Once Yaneth Castaneda DO         No current [facility]-ordered outpatient medications on file.   [3] No Known Allergies

## 2025-01-27 NOTE — ANESTHESIA PROCEDURE NOTES
Airway  Date/Time: 1/27/2025 2:17 PM  Urgency: elective    Airway not difficult    General Information and Staff    Patient location during procedure: OR  Anesthesiologist: Steve Cooper MD  Performed: anesthesiologist   Performed by: Steve Cooper MD  Authorized by: Steve Cooper MD      Indications and Patient Condition  Indications for airway management: anesthesia  Spontaneous ventilation: present  Sedation level: deep  Preoxygenated: yes  Patient position: sniffing  Mask difficulty assessment: 1 - vent by mask    Final Airway Details  Final airway type: supraglottic airway      Successful airway: classic  Size 3       Number of attempts at approach: 1    Additional Comments  PreO2 to 100%, smooth induction, LMA x 1 atraumatic x 1

## 2025-01-27 NOTE — ANESTHESIA PROCEDURE NOTES
Peripheral Block    Date/Time: 1/27/2025 1:50 PM    Performed by: Steve Cooper MD  Authorized by: Steve Cooper MD      General Information and Staff    Start Time:  1/27/2025 1:50 PM  End Time:  1/27/2025 1:52 PM  Anesthesiologist:  Steve Cooper MD  Performed by:  Anesthesiologist  Patient Location:  PACU    Block Placement: Pre Induction  Site Identification: real time ultrasound guided and image stored and retrievable    Block site/laterality marked before start: site marked  Reason for Block: at surgeon's request and post-op pain management    Preanesthetic Checklist: 2 patient identifers, IV checked, site marked, risks and benefits discussed, monitors and equipment checked, pre-op evaluation, timeout performed, anesthesia consent, sterile technique used, no prohibitive neurological deficits and no local skin infection at insertion site      Procedure Details    Patient Position:  Right lateral decubitus  Prep: ChloraPrep    Monitoring:  Heart rate, cardiac monitor, continuous pulse ox and blood pressure cuff  Block Type:  Sciatic  Laterality:  Left  Injection Technique:  Single-shot    Needle    Needle Type:  Echogenic  Needle Gauge:  20 G  Needle Length:  100 mm  Needle Localization:  Ultrasound guidance  Reason for Ultrasound Use: appropriate spread of the medication was noted in real time and no ultrasound evidence of intravascular and/or intraneural injection            Assessment    Injection Assessment:  Good spread noted, incremental injection, local visualized surrounding nerve on ultrasound, low pressure, negative aspiration for heme, negative resistance and no pain on injection  Paresthesia Pain:  None  Heart Rate Change: Yes    - Patient tolerated block procedure well without evidence of immediate block related complications.     Medications  1/27/2025 1:50 PM  midazolam (VERSED)injection 2mg/2ml - Intravenous   2 mg - 1/27/2025 1:47:00 PM  fentaNYL (SUBLIMAZE) injection 50mcg/ml -  Intravenous   50 mcg - 1/27/2025 1:48:00 PM  ropivacaine (NAROPIN) injection 0.5% - Infiltration   30 mL - 1/27/2025 1:50:00 PM  dexamethasone (DECADRON) PF injection 10 mg/ml - Injection   10 mg - 1/27/2025 1:50:00 PM    Additional Comments    Identified, interviewed in Preop.  To PACU for block.  Pulse 144, crying, consoled.  Right lateral position.  Versed, fentanyl to calm.  1% lido 25G needle, Stimuplex Ultra 360, excellent images saved.  She did great, watching and talking, didn't feel it.  Heart rate changed by slowing down as she calmed.

## 2025-01-27 NOTE — BRIEF OP NOTE
Pre-Operative Diagnosis: Lisfranc dislocation, left, initial encounter [S93.325A]     Post-Operative Diagnosis: Lisfranc dislocation, left, initial encounter [S93.325A]      Procedure Performed:   Left foot open reduction internal fixation Lisfranc joint, open reduction internal fixation second through fourth metatarsals    Surgeons and Role:     * Yaneth Castaneda DO - Primary    Assistant(s):  Surgical Assistant.: Risa Alcantar     Surgical Findings: fracture/dislocation     Specimen: n/a     Estimated Blood Loss: 5 mL    Dictation Number:  [id number]    Yaneth Castaneda DO  1/27/2025  4:58 PM

## 2025-01-28 ENCOUNTER — TELEPHONE (OUTPATIENT)
Dept: ORTHOPEDICS CLINIC | Facility: CLINIC | Age: 25
End: 2025-01-28

## 2025-01-28 NOTE — OPERATIVE REPORT
[facility]    PATIENT'S NAME: JOMAR DAMICO   ATTENDING PHYSICIAN: Yaneth Castaneda DO   OPERATING PHYSICIAN: Yaneth Castaneda DO   PATIENT ACCOUNT#:   [id number]    LOCATION:  [facility] PACU [address] [address]  MEDICAL RECORD #:   [id number]       YOB: 2000  ADMISSION DATE:       01/27/2025      OPERATION DATE:  01/27/2025    OPERATIVE REPORT      PREOPERATIVE DIAGNOSIS:    1.   Left foot Lisfranc fracture-dislocation.    2.   Left second, third, and fourth metatarsal fractures.  POSTOPERATIVE DIAGNOSIS:    1.   Left foot Lisfranc fracture-dislocation.    2.   Left second, third, and fourth metatarsal fractures.  PROCEDURE:    1.   Open reduction and internal fixation of the left Lisfranc joint of first, second, and third tarsometatarsal joints.  2.   Open reduction and internal fixation of second, third, and fourth metatarsals.    ASSISTANT:  CHU Lr, who was essential in aiding in reduction, assisting in repair, in order to facilitate surgery and preserve the neurovascular bundle.     ANESTHESIA:  General, with a regional block.     HEMOSTASIS:  Pneumatic tourniquet inflated to 250 mmHg for approximately 120 minutes.    ESTIMATED BLOOD LOSS:  5 mL.    IMPLANTS:    1.   [place] 4.0 x 38 mm cannulated screw.  2.   [place] 4.0 x 34 mm cannulated screw.  3.   [place] washer.   4.   Jenelle mini frag T-plate x2, with subsequent locking screws.  5.   [place] 2.4 locking screw x 14 mm.    COMPLICATIONS:  None.    CONDITION:  Stable, to PACU.     INDICATIONS:  Patient is a 24-year-old female with a history of a fracture-dislocation of her left foot.  She met surgical criteria.  All risks, benefits and alternatives were explained to the patient including, but not limited to, DVT, infection, wound-healing problems, bone-healing problems, neurovascular damage, risk of continued pain, risk of posttraumatic arthritis, and risk for need for further surgery.  The patient understood and  wished to proceed with the above procedure.      OPERATIVE TECHNIQUE:  The patient was met in the preoperative holding area and marked with an indelible marker.  She was brought back to the operative suite, placed on the operating table in supine position.  There, anesthesia was administered by department of anesthesia.  Once she was adequately sedated, a well-padded left thigh tourniquet was placed, and she was prepped and draped in the usual sterile fashion.  A time-out was performed.  All in the room were in agreement with patient, procedure, and site of procedure.  Preoperative antibiotics were administered by the department of anesthesia prior to incision.  The limb was exsanguinated and the tourniquet inflated to 250 mmHg and would remain inflated for approximately 120 minutes.      OPEN REDUCTION AND INTERNAL FIXATION OF LISFRANC JOINT:  We made a dorsal incision over the first and second TMT joints and dissected down.  There was subluxation and capsular avulsion noted of the first TMT joint and dislocation of the second TMT.  The second TMT was reduced with a reduction clamp, and a compression clamp was placed from the second metatarsal to the medial cuneiform.  A K-wire was drilled and inserted and the position checked under fluoroscopy.  Once we liked our reduction, we drilled and inserted a 4.0 headed screw and got compression across our joint surface.  Another wire was placed across the first TMT into the middle cuneiform and position checked under fluoroscopy.  Once it was in good position, we drilled, measured, and inserted a headed screw with a washer.  Of note, the bone was soft, and the washer helped achieve purchase in the bone.  A second incision was made over the third TMT joint and dissection carried down, and the dislocation noted of the third joint was identified.  A clamp was used to help pin this in position.  It was reduced, and a T-plate was provisionally placed over the joint.  Once it  was in good position, we drilled and inserted locking screws into the lateral cuneiform and a locking screw into the metatarsal.  This got good reduction of the joint.  There was good placement of the screws.    OPEN REDUCTION AND INTERNAL FIXATION OF THE THIRD AND FOURTH METATARSALS:  There were separate fractures of the third and fourth metatarsals which were reduced under direct visualization and a dorsal plate was placed.  We drilled and inserted locking screws both proximal and distal to the fracture site to help hold the reduction.  There was good overall alignment present of the bones.      The wounds were thoroughly irrigated.  Of note, the neurovascular bundle was identified and protected throughout the entirety of the procedure.  It was located just deep to the dorsal medial incision.  Incisions were closed using 2-0 Vicryl for the subcutaneous tissue and a running 3-0 Monocryl for subcuticular closure.  A sterile dressing was placed consisting of Xeroform, 4 x 4, ABD, Webril, and a bulky Riley sugar-tong splint.  The tourniquet had been deflated at the end of the procedure, and careful hemostasis was obtained prior to closure.  The patient was awoken from anesthesia and brought to the PACU in stable condition.  She will be nonweightbearing through her left lower extremity, receive DVT prophylaxis, follow up in the office in 2 weeks.     Dictated By Yaneth Castaneda DO  d: 01/27/2025 17:05:07  t: 01/27/2025 17:42:53  [facility] [id number]  [facility]/

## 2025-01-28 NOTE — TELEPHONE ENCOUNTER
Post-op call for Dr. Castaneda    Date: 1/28/2025      Time: 2:12 PM   Patient: Kyle Sprague      number: [id number]   Surgery and surgery date:1/27/2025    Procedure Laterality Anesthesia   Left foot open reduction internal fixation Lisfranc joint, open reduction internal fixation third and fourth metatarsals Left General     Patient unavailable.  Left message on voicemail to call clinic with questions or concerns.  Number was provided./ SPOKE TO PATIENT  Patient's general feeling since discharge:/  I'm fine, the nerve block is still working. I can't feel my foot or top of my knee  Pain control (0-10):/2  Pain Medication:  dose/strength/  aspirin 81 MG Oral Tab EC 60 tablet 0 1/27/2025 --   Sig:   Take 1 tablet (81 mg t       Medication Quantity Refills Start End   ibuprofen 600 MG Oral Tab 60 tablet 1 1/22/2025 --   Sig:   Take 1 tablet (600 mg total) b     Dr Merino ordered Tramadol for her. She took a norco and became very anxious and heart palpitations  Fever: no  Chills:  no  SOB: no  Incision site appearance:  Redness   no  Drainage no  Clean/dry/Intact yes  Calf pain, redness or warmth:  no   Bowel Regimen: Yes  LBM 1/27/2025  If not, what are you taking stool softener/laxative?  Confirmed appointment date for post op:2/12/2025  Other concerns patients may ask:Asking when the block will wear off. I said it can be up to 10-18 hours   Bathing and bandages   Patient needs to keep incision clean and dry for the first week./DONE  Enforce rest, ice, compression elevation and use their pain medication accordingly/DONE .We discussed the application under her knee and 20min on 20min off multiple times a day  If the patient needs more pain medication, please put in a communication.

## 2025-02-03 ENCOUNTER — TELEPHONE (OUTPATIENT)
Dept: ORTHOPEDICS CLINIC | Facility: CLINIC | Age: 25
End: 2025-02-03

## 2025-02-03 DIAGNOSIS — M79.672 CHRONIC HEEL PAIN, LEFT: Primary | ICD-10-CM

## 2025-02-03 DIAGNOSIS — G89.29 CHRONIC HEEL PAIN, LEFT: Primary | ICD-10-CM

## 2025-02-05 NOTE — TELEPHONE ENCOUNTER
Dr. Merino,    *ACKNOWLEDGE BUTTON HAS BEEN MOVED TO THE TOP RIGHT RIBBON*    Please sign off on form if you agree to: Family Medical Leave Act      -Signature page will be the first page scanned  -From your Inbasket, Highlight the patient and click Chart   -Double click the 01/17/2025 Forms Completion telephone encounter  -Scroll down to the Media section   -Click the blue Hyperlink: Family Medical Leave Act Dr. Merino 2/5/2025  -Click Acknowledge located in the top right ribbon/menu   -Drag the mouse into the blank space of the document and a + sign will appear. Left click to   electronically sign the document.  -Once signed, simply exit out of the screen and you signature will be saved.     Thank you,    Carol Ann

## 2025-02-06 NOTE — TELEPHONE ENCOUNTER
[facility][phone] waiting on confirmation sending forms to patient via [employer].  Efax successful.

## 2025-02-12 ENCOUNTER — HOSPITAL ENCOUNTER (OUTPATIENT)
Dept: GENERAL RADIOLOGY | Age: 25
Discharge: HOME | End: 2025-02-12
Attending: ORTHOPAEDIC SURGERY
Payer: COMMERCIAL

## 2025-02-12 ENCOUNTER — OFFICE VISIT (OUTPATIENT)
Dept: ORTHOPEDICS CLINIC | Facility: CLINIC | Age: 25
End: 2025-02-12

## 2025-02-12 DIAGNOSIS — S93.325D LISFRANC DISLOCATION, LEFT, SUBSEQUENT ENCOUNTER: Primary | ICD-10-CM

## 2025-02-12 DIAGNOSIS — S93.325D LISFRANC DISLOCATION, LEFT, SUBSEQUENT ENCOUNTER: ICD-10-CM

## 2025-02-12 DIAGNOSIS — S92.332A CLOSED DISPLACED FRACTURE OF THIRD METATARSAL BONE OF LEFT FOOT, INITIAL ENCOUNTER: ICD-10-CM

## 2025-02-12 PROCEDURE — 73630 X-RAY EXAM OF FOOT: CPT | Performed by: ORTHOPAEDIC SURGERY

## 2025-02-12 PROCEDURE — 99024 POSTOP FOLLOW-UP VISIT: CPT | Performed by: ORTHOPAEDIC SURGERY

## 2025-02-12 RX ORDER — ERGOCALCIFEROL 1.25 MG/1
50000 CAPSULE ORAL WEEKLY
Qty: 8 CAPSULE | Refills: 0 | Status: SHIPPED | OUTPATIENT
Start: 2025-02-12

## 2025-02-16 NOTE — PROGRESS NOTES
Chief Complaint   Patient presents with    Post-Op     Left foot ORIF done on 1/27. Patient denies any pain at this time      HPI  Patient notes her pain is well-controlled.  She is happy to get her splint off.  She stopped taking the narcotics.    Physical Exam  Gen: NAD, alert, answering questions appropriately  HEENT: NCAT, EOMI  Lungs: NL breathing, symmetrical lung expansion  Abd: Soft, ND  Extremities:   Left foot with neutral alignment, limited but painless foot range of motion.DF/PF/EHL intact, SILT, cap refill brisk.  Incisions clean, dry, intact.  Moderate postoperative swelling present.    Imaging seen and reviewed by me:   3 views of the left foot status post ORIF of second and third metatarsals with ORIF of the Lisfranc joint and first TMT joint.  No evidence of hardware failure or migration.  Improved foot reduction present.    Assessment/Plan: 25 year old year old female presenting status post ORIF of the left foot Lisfranc joint of first second and third TMT joints, ORIF of the second third and fourth metatarsals on 1/27/2025.  1. Lisfranc dislocation, left, subsequent encounter  Patient will remain nonweightbearing for 4-6 weeks postoperatively.  - XR FOOT WEIGHTBEARING (3 VIEWS), LEFT (CPT=73630); Future.    She may begin gentle ABC range of motion exercises.    2. Closed displaced fracture of third metatarsal bone of left foot, initial encounter  She should continue to ice and elevate her foot.  Recommended vitamin D due to her low levels and extremely soft bone during surgery.      Return in about 4 weeks (around 3/12/2025) for left foot xray.      Yaneth Castaneda,   02/16/25

## 2025-02-18 ENCOUNTER — TELEPHONE (OUTPATIENT)
Dept: ORTHOPEDICS CLINIC | Facility: CLINIC | Age: 25
End: 2025-02-18

## 2025-02-18 NOTE — TELEPHONE ENCOUNTER
Dr. Castaneda,    *ACKNOWLEDGE BUTTON HAS BEEN MOVED TO THE TOP RIGHT RIBBON*    Please sign off on form if you agree to: disability form for left foot surgery    -Signature page will be the first page scanned  -From your Inbasket, Highlight the patient and click Chart   -Double click the 2/18/2025 Forms Completion telephone encounter  -Scroll down to the Media section   -Click the blue Hyperlink: disability Dr Castaneda 2/18/2025  -Click Acknowledge located in the top right ribbon/menu   -Drag the mouse into the blank space of the document and a + sign will appear. Left click to   electronically sign the document.  -Once signed, simply exit out of the screen and you signature will be saved.     Thank you,    Carol Ann

## 2025-02-21 NOTE — TELEPHONE ENCOUNTER
Faxed completed forms to [facility] fax #[phone] Fax successful and scanned into this encounter. sending copies to patient via [employer].

## 2025-03-12 ENCOUNTER — OFFICE VISIT (OUTPATIENT)
Dept: ORTHOPEDICS CLINIC | Facility: CLINIC | Age: 25
End: 2025-03-12

## 2025-03-12 ENCOUNTER — HOSPITAL ENCOUNTER (OUTPATIENT)
Dept: GENERAL RADIOLOGY | Age: 25
Discharge: HOME | End: 2025-03-12
Attending: ORTHOPAEDIC SURGERY
Payer: COMMERCIAL

## 2025-03-12 DIAGNOSIS — S92.342D CLOSED DISPLACED FRACTURE OF FOURTH METATARSAL BONE OF LEFT FOOT WITH ROUTINE HEALING, SUBSEQUENT ENCOUNTER: ICD-10-CM

## 2025-03-12 DIAGNOSIS — S93.325D LISFRANC DISLOCATION, LEFT, SUBSEQUENT ENCOUNTER: ICD-10-CM

## 2025-03-12 DIAGNOSIS — S93.325D DISLOCATION OF TARSOMETATARSAL JOINT OF LEFT FOOT, SUBSEQUENT ENCOUNTER: Primary | ICD-10-CM

## 2025-03-12 PROCEDURE — 73630 X-RAY EXAM OF FOOT: CPT | Performed by: ORTHOPAEDIC SURGERY

## 2025-03-12 PROCEDURE — 99024 POSTOP FOLLOW-UP VISIT: CPT | Performed by: ORTHOPAEDIC SURGERY

## 2025-03-12 NOTE — PROGRESS NOTES
Chief Complaint   Patient presents with    Follow - Up     Left foot sx done on 1/27. Patient denies any pain at this time. X-ray were done today.      HPI  Pt has improved pain. Her last stitch fell out. She stood on it a couple of times and felt pressure but no pain.    Physical Exam  Gen: NAD, alert, answering questions appropriately  HEENT: NCAT, EOMI  Lungs: NL breathing, symmetrical lung expansion  Abd: Soft, ND  Extremities:   Left ankle DF/PF/EHL intact, SILT, cap refill brisk  left Foot with neutral alignment, painless foot range of motion. Incisions well healed.     Imaging seen and reviewed by me:   3 views of the left foot status post ORIF of the Lisfranc joint with screw fixation along the first and second TMT joints.  Status post ORIF of the third metatarsal and fourth metatarsals.  Stable alignment of the foot present.  Interval healing at the fracture sites noted    Assessment/Plan: 25 year old year old female presenting status post ORIF of the left foot Lisfranc joint of first second and third TMT joints, ORIF of the second third and fourth metatarsals on 1/27/2025.   1. Lisfranc dislocation, left, subsequent encounter  Patient may begin weightbearing as tolerated in cam boot and physical therapy.  Will plan on hardware removal in 6 weeks.  - XR FOOT WEIGHTBEARING (3 VIEWS), LEFT (CPT=73630); Future  - Physical Therapy Referral - [facility]  - Physical Therapy Referral - External    2. Closed displaced fracture of fourth metatarsal bone of left foot, subsequent encounter  We discussed return to work plans to be 3 weeks postop after the hardware removal  - XR FOOT WEIGHTBEARING (3 VIEWS), LEFT (CPT=73630); Future  - Physical Therapy Referral - [facility]  - Physical Therapy Referral - External         Return in about 4 weeks (around 4/9/2025) for xrays .      HWR at 3 months. Plan to return to work 3 weeks post-op.     Yaneth Castaneda,   03/12/25

## 2025-03-13 ENCOUNTER — TELEPHONE (OUTPATIENT)
Dept: ORTHOPEDICS CLINIC | Facility: CLINIC | Age: 25
End: 2025-03-13

## 2025-03-13 DIAGNOSIS — S92.342D CLOSED DISPLACED FRACTURE OF FOURTH METATARSAL BONE OF LEFT FOOT WITH ROUTINE HEALING, SUBSEQUENT ENCOUNTER: ICD-10-CM

## 2025-03-13 DIAGNOSIS — S93.325D DISLOCATION OF TARSOMETATARSAL JOINT OF LEFT FOOT, SUBSEQUENT ENCOUNTER: Primary | ICD-10-CM

## 2025-03-13 NOTE — TELEPHONE ENCOUNTER
Type of surgery:  Left Foot Hardware Removal of 1st, 2nd, and 3rd Tarsometarsal Joint, Tenolysis Extensor Tendons   Date: 4/21/25  Location: [facility]  Medical Clearance:      *Medical:      *Dental:      *Other:  Prior Authorization Status: Pending   Workers Comp:  [place]:  [place]: Yes  POV: 5/14/25

## 2025-03-13 NOTE — TELEPHONE ENCOUNTER
Patient called back to confirm that she received my message. She asked if she could have the surgery sooner because she was under the impression that she would be able to return to work around the end of April. Dr. Castaneda please advise.

## 2025-03-14 NOTE — TELEPHONE ENCOUNTER
Spoke with patient to inform her per Dr. Castaneda, the earliest she could have surgery is 4/17. Dr. Castaneda stated that she can go back to work for 1 month and take off 1 week after surgery but she has to wear cam boot for 1 month. Patient stated that she can't wear the boot at work, so she will keep her surgery date the same and stay off work until after surgery.

## 2025-03-25 DIAGNOSIS — F41.9 ANXIETY: ICD-10-CM

## 2025-03-25 NOTE — TELEPHONE ENCOUNTER
Received Protected health information via Fax in the forms department. Valid authorization on file. Logged for processing

## 2025-03-26 NOTE — TELEPHONE ENCOUNTER
Completed Protected health information request. Scanned into this TE. Efaxed to [facility] [phone], awaiting confirmation.

## 2025-03-26 NOTE — TELEPHONE ENCOUNTER
Received additional Protected health information request; requesting notes from January 2025 through unknown. Valid Release of Information on fie. 2-2 Logged for processing.

## 2025-03-27 RX ORDER — BUPROPION HYDROCHLORIDE 150 MG/1
150 TABLET ORAL DAILY
Qty: 90 TABLET | Refills: 1 | Status: SHIPPED | OUTPATIENT
Start: 2025-03-27

## 2025-03-27 NOTE — TELEPHONE ENCOUNTER
Completed additional Protected health information request. Scanned into patient's chart. Efaxed to [employer] [phone], awaiting confirmation.Confirmation received.

## 2025-04-03 ENCOUNTER — HOSPITAL ENCOUNTER (OUTPATIENT)
Dept: GENERAL RADIOLOGY | Age: 25
Discharge: HOME | End: 2025-04-03
Attending: ORTHOPAEDIC SURGERY
Payer: COMMERCIAL

## 2025-04-03 DIAGNOSIS — S92.342D CLOSED DISPLACED FRACTURE OF FOURTH METATARSAL BONE OF LEFT FOOT WITH ROUTINE HEALING, SUBSEQUENT ENCOUNTER: ICD-10-CM

## 2025-04-03 DIAGNOSIS — S93.325D DISLOCATION OF TARSOMETATARSAL JOINT OF LEFT FOOT, SUBSEQUENT ENCOUNTER: ICD-10-CM

## 2025-04-03 PROCEDURE — 73630 X-RAY EXAM OF FOOT: CPT | Performed by: ORTHOPAEDIC SURGERY

## 2025-04-09 ENCOUNTER — OFFICE VISIT (OUTPATIENT)
Dept: ORTHOPEDICS CLINIC | Facility: CLINIC | Age: 25
End: 2025-04-09

## 2025-04-09 DIAGNOSIS — S92.342D CLOSED DISPLACED FRACTURE OF FOURTH METATARSAL BONE OF LEFT FOOT WITH ROUTINE HEALING, SUBSEQUENT ENCOUNTER: ICD-10-CM

## 2025-04-09 DIAGNOSIS — S93.325D DISLOCATION OF TARSOMETATARSAL JOINT OF LEFT FOOT, SUBSEQUENT ENCOUNTER: Primary | ICD-10-CM

## 2025-04-09 DIAGNOSIS — E55.9 VITAMIN D DEFICIENCY DISEASE: ICD-10-CM

## 2025-04-09 PROCEDURE — 99024 POSTOP FOLLOW-UP VISIT: CPT | Performed by: ORTHOPAEDIC SURGERY

## 2025-04-09 NOTE — H&P (VIEW-ONLY)
Chief Complaint   Patient presents with    Follow - Up     Left foot follow up Patient denies any pain at this time. X-rays were completed on 4/3     HPI  Pt notes some soreness when she walks. Her foot feels wide due to swelling.  Overall her pain is improving.    Physical Exam  Gen: NAD, alert, answering questions appropriately  HEENT: NCAT, EOMI  Lungs: NL breathing, symmetrical lung expansion  Abd: Soft, ND  Extremities:   Left foot with  neutral alignment, painless foot and ankle incision  range of motion.  Incisions clean, dry, intact.  Midfoot nontender. DF/PF/EHL intact, SILT, cap refill brisk    Imaging seen and reviewed by me:   3 views of the left foot status post ORIF of the Lisfranc joint first, second, and third TMT joints, ORIF of fourth metatarsal.  Overall interval healing noted at fracture sites.  No evidence of hardware failure or migration.  Osteopenia present.  Plantigrade foot present.    Assessment/Plan: 25 year old year old female presenting status post ORIF of the left foot Lisfranc joint of first second and third TMT joints, ORIF of the second third and fourth metatarsals on 1/27/2025.   1. Dislocation of tarsometatarsal joint of left foot, subsequent encounter  Patient may transition to a gym shoe as tolerated.  She may benefit from custom orthotics.  She can continue physical therapy exercises to help ambulation.  - DME - External    2. Closed displaced fracture of fourth metatarsal bone of left foot with routine healing, subsequent encounter  Continue therapy exercises.    3. Vitamin D deficiency disease  Repeat vitamin D.  In the meantime continue 2000 IU of oral vitamin D  - Vitamin D; Future     May transition to gym shoe .  We will plan on hwr on 4/21    No follow-ups on file.      Yaneth Castaneda,   04/09/25

## 2025-04-15 NOTE — TELEPHONE ENCOUNTER
Received Protected health information in forms department. Logged for processing. Valid Release of Information on file.

## 2025-04-18 NOTE — DISCHARGE INSTRUCTIONS
Dr. Castaneda's Discharge Instructions    Keep your dressing clean, dry, and intact. Do not remove until you follow up in the office.     Ice and elevate your leg. You can place a bag full of ice directly on your splint. When you elevate your leg, put the pillow under your calf and leave your heel free.     You may put some weight on your leg. Use crutches to help get around as needed.    **If the dressing feels too tight- LOOSEN the ace bandage!** You may take off the ace bandage if necessary and reapply it looser. After surgery, you tend to swell which may make the splint feel too tight.    Call the office if you have increased pain/swelling that is not being managed by your pain medicine. You should also call the office if there is increased redness or swelling going beyond your dressing. If there is bleeding that is coming through your dressing, reinforce it with guaze bandages and an ace bandage. Please call the office with any questions.    You may take the hydrocodone for pain relief. This can be taken every 6 hours as needed for pain. If you have breakthrough pain and it's not time for your hydrocodone, you may take 600 mg of ibuprofen every 6 hours as well if you don't have any kidney problems or history of problems with NSAIDs.     You should take 81 mg of aspirin twice a day for 1 month after surgery to prevent blood clots.     You should schedule a follow up appointment for 2 weeks after surgery.       Call Dr. Castaneda's office at [phone] with any questions.         HOME INSTRUCTIONS  AMBSURG HOME CARE INSTRUCTIONS: POST-OP ANESTHESIA  The medication that you received for sedation or general anesthesia can last up to 24 hours. Your judgment and reflexes may be altered, even if you feel like your normal self.      We Recommend:   Do not drive any motor vehicle or bicycle   Avoid mowing the lawn, playing sports, or working with power tools/applicances (power saws, electric knives or mixers)   That you  have someone stay with you on your first night home   Do not drink alcohol or take sleeping pills or tranquilizers   Do not sign legal documents within 24 hours of your procedure   If you had a nerve block for your surgery, take extra care not to put any pressure on your arm or hand for 24 hours    It is normal:  For you to have a sore throat if you had a breathing tube during surgery (while you were asleep!). The sore throat should get better within 48 hours. You can gargle with warm salt water (1/2 tsp in 4 oz warm water) or use a throat lozenge for comfort  To feel muscle aches or soreness especially in the abdomen, chest or neck. The achy feeling should go away in the next 24 hours  To feel weak, sleepy or \"wiped out\". Your should start feeling better in the next 24 hours.   To experience mild discomforts such as sore lip or tongue, headache, cramps, gas pains or a bloated feeling in your abdomen.   To experience mild back pain or soreness for a day or two if you had spinal or epidural anesthesia.   If you had laparoscopic surgery, to feel shoulder pain or discomfort on the day of surgery.   For some patients to have nausea after surgery/anesthesia    If you feel nausea or experience vomiting:   Try to move around less.   Eat less than usual or drink only liquids until the next morning   Nausea should resolve in about 24 hours    If you have a problem when you are at home:    Call your surgeons office     Discharge Instructions: After Your Surgery  You’ve just had surgery. During surgery, you were given medicine called anesthesia to keep you relaxed and free of pain. After surgery, you may have some pain or nausea. This is common. Here are some tips for feeling better and getting well after surgery.   Going home  Your healthcare provider will show you how to take care of yourself when you go home. They'll also answer your questions. Have an adult family member or friend drive you home. For the first 24 hours  after your surgery:   Don't drive or use heavy equipment.  Don't make important decisions or sign legal papers.  Take medicines as directed.  Don't drink alcohol.  Have someone stay with you, if needed. They can watch for problems and help keep you safe.  Be sure to go to all follow-up visits with your healthcare provider. And rest after your surgery for as long as your provider tells you to.   Coping with pain  If you have pain after surgery, pain medicine will help you feel better. Take it as directed, before pain becomes severe. Also, ask your healthcare provider or pharmacist about other ways to control pain. This might be with heat, ice, or relaxation. And follow any other instructions your surgeon or nurse gives you.      Stay on schedule with your medicine.     Tips for taking pain medicine  To get the best relief possible, remember these points:   Pain medicines can upset your stomach. Taking them with a little food may help.  Most pain relievers taken by mouth need at least 20 to 30 minutes to start to work.  Don't wait till your pain becomes severe before you take your medicine. Try to time your medicine so that you can take it before starting an activity. This might be before you get dressed, go for a walk, or sit down for dinner.  Constipation is a common side effect of some pain medicines. Call your healthcare provider before taking any medicines, such as laxatives or stool softeners, to help ease constipation. Also ask if you should skip any foods. Drinking lots of fluids and eating foods, such as fruits and vegetables, that are high in fiber can also help. Remember, don't take laxatives unless your surgeon has prescribed them.  Drinking alcohol and taking pain medicine can cause dizziness and slow your breathing. It can even be deadly. Don't drink alcohol while taking pain medicine.  Pain medicine can make you react more slowly to things. Don't drive or run machinery while taking pain medicine.  Your  healthcare provider may tell you to take acetaminophen to help ease your pain. Ask them how much you're supposed to take each day. Acetaminophen or other pain relievers may interact with your prescription medicines or other over-the-counter (OTC) medicines. Some prescription medicines have acetaminophen and other ingredients in them. Using both prescription and OTC acetaminophen for pain can cause you to accidentally overdose. Read the labels on your OTC medicines with care. This will help you to clearly know the list of ingredients, how much to take, and any warnings. It may also help you not take too much acetaminophen. If you have questions or don't understand the information, ask your pharmacist or healthcare provider to explain it to you before you take the OTC medicine.   Managing nausea  Some people have an upset stomach (nausea) after surgery. This is often because of anesthesia, pain, or pain medicine, less movement of food in the stomach, or the stress of surgery. These tips will help you handle nausea and eat healthy foods as you get better. If you were on a special food plan before surgery, ask your healthcare provider if you should follow it while you get better. Check with your provider on how your eating should progress. It may depend on the surgery you had. These general tips may help:   Don't push yourself to eat. Your body will tell you when to eat and how much.  Start off with clear liquids and soup. They're easier to digest.  Next try semi-solid foods as you feel ready. These include mashed potatoes, applesauce, and gelatin.  Slowly move to solid foods. Don’t eat fatty, rich, or spicy foods at first.  Don't force yourself to have 3 large meals a day. Instead eat smaller amounts more often.  Take pain medicines with a small amount of solid food, such as crackers or toast. This helps prevent nausea.  When to call your healthcare provider  Call your healthcare provider right away if you have any of  these:   You still have too much pain, or the pain gets worse, after taking the medicine. The medicine may not be strong enough. Or there may be a complication from the surgery.  You feel too sleepy, dizzy, or groggy. The medicine may be too strong.  Side effects, such as nausea or vomiting. Your healthcare provider may advise taking other medicines to treat these or may change your treatment plan..  Skin changes, such as rash, itching, or hives. This may mean you have an allergic reaction. Your provider may advise taking other medicines.  The incision looks different (for instance, part of it opens up).  Bleeding or fluid leaking from the incision site, and you weren't told to expect that.  Fever of 100.4°F (38°C) or higher, or as directed by your healthcare provider.  Call 911  Call 911 right away if you have:   Trouble breathing  Facial swelling    If you have obstructive sleep apnea   You were given anesthesia medicine during surgery to keep you comfortable and free of pain. After surgery, you may have more apnea spells because of this medicine and other medicines you were given. The spells may last longer than normal.    At home:  Keep using the continuous positive airway pressure (CPAP) device when you sleep. Unless your healthcare provider tells you not to, use it when you sleep, day or night. CPAP is a common device used to treat obstructive sleep apnea.  Talk with your provider before taking any pain medicine, muscle relaxants, or sedatives. Your provider will tell you about the possible dangers of taking these medicines.  Contact your provider if your sleeping changes a lot even when taking medicines as directed.  [employer] last reviewed this educational content on 4/1/2024  This information is for informational purposes only. This is not intended to be a substitute for professional medical advice, diagnosis, or treatment. Always seek the advice and follow the directions from your physician or other  qualified health care provider.  © 6429-2381 [employer]. All rights reserved. This information is not intended as a substitute for professional medical care. Always follow your healthcare professional's instructions.

## 2025-04-21 ENCOUNTER — ANESTHESIA (OUTPATIENT)
Dept: SURGERY | Facility: HOSPITAL | Age: 25
End: 2025-04-21
Payer: COMMERCIAL

## 2025-04-21 ENCOUNTER — ANESTHESIA EVENT (OUTPATIENT)
Dept: SURGERY | Facility: HOSPITAL | Age: 25
End: 2025-04-21
Payer: COMMERCIAL

## 2025-04-21 ENCOUNTER — HOSPITAL ENCOUNTER (OUTPATIENT)
Facility: HOSPITAL | Age: 25
Setting detail: HOSPITAL OUTPATIENT SURGERY
Discharge: HOME | End: 2025-04-21
Attending: ORTHOPAEDIC SURGERY | Admitting: ORTHOPAEDIC SURGERY
Payer: COMMERCIAL

## 2025-04-21 ENCOUNTER — APPOINTMENT (OUTPATIENT)
Dept: GENERAL RADIOLOGY | Facility: HOSPITAL | Age: 25
End: 2025-04-21
Attending: ORTHOPAEDIC SURGERY
Payer: COMMERCIAL

## 2025-04-21 VITALS
OXYGEN SATURATION: 100 % | DIASTOLIC BLOOD PRESSURE: 59 MMHG | HEART RATE: 97 BPM | TEMPERATURE: 98 F | WEIGHT: 175 LBS | BODY MASS INDEX: 32.2 KG/M2 | HEIGHT: 62 IN | RESPIRATION RATE: 13 BRPM | SYSTOLIC BLOOD PRESSURE: 132 MMHG

## 2025-04-21 DIAGNOSIS — S93.325D DISLOCATION OF TARSOMETATARSAL JOINT OF LEFT FOOT, SUBSEQUENT ENCOUNTER: ICD-10-CM

## 2025-04-21 DIAGNOSIS — T84.84XA PAINFUL ORTHOPAEDIC HARDWARE: Primary | ICD-10-CM

## 2025-04-21 DIAGNOSIS — S92.342D CLOSED DISPLACED FRACTURE OF FOURTH METATARSAL BONE OF LEFT FOOT WITH ROUTINE HEALING, SUBSEQUENT ENCOUNTER: ICD-10-CM

## 2025-04-21 LAB — B-HCG UR QL: NEGATIVE

## 2025-04-21 PROCEDURE — 64450 NJX AA&/STRD OTHER PN/BRANCH: CPT | Performed by: ORTHOPAEDIC SURGERY

## 2025-04-21 PROCEDURE — 88300 SURGICAL PATH GROSS: CPT | Performed by: ORTHOPAEDIC SURGERY

## 2025-04-21 PROCEDURE — 76000 FLUOROSCOPY <1 HR PHYS/QHP: CPT | Performed by: ORTHOPAEDIC SURGERY

## 2025-04-21 PROCEDURE — 76942 ECHO GUIDE FOR BIOPSY: CPT | Performed by: ORTHOPAEDIC SURGERY

## 2025-04-21 PROCEDURE — 81025 URINE PREGNANCY TEST: CPT

## 2025-04-21 RX ORDER — OXYCODONE HYDROCHLORIDE 5 MG/1
5 TABLET ORAL EVERY 4 HOURS PRN
Status: DISCONTINUED | OUTPATIENT
Start: 2025-04-21 | End: 2025-04-21

## 2025-04-21 RX ORDER — PROCHLORPERAZINE EDISYLATE 5 MG/ML
5 INJECTION INTRAMUSCULAR; INTRAVENOUS EVERY 8 HOURS PRN
Status: DISCONTINUED | OUTPATIENT
Start: 2025-04-21 | End: 2025-04-21

## 2025-04-21 RX ORDER — LIDOCAINE HYDROCHLORIDE 10 MG/ML
INJECTION, SOLUTION EPIDURAL; INFILTRATION; INTRACAUDAL; PERINEURAL AS NEEDED
Status: DISCONTINUED | OUTPATIENT
Start: 2025-04-21 | End: 2025-04-21 | Stop reason: SURG

## 2025-04-21 RX ORDER — KETOROLAC TROMETHAMINE 30 MG/ML
30 INJECTION, SOLUTION INTRAMUSCULAR; INTRAVENOUS ONCE
Status: COMPLETED | OUTPATIENT
Start: 2025-04-21 | End: 2025-04-21

## 2025-04-21 RX ORDER — ROPIVACAINE HYDROCHLORIDE 5 MG/ML
INJECTION, SOLUTION EPIDURAL; INFILTRATION; PERINEURAL
Status: COMPLETED | OUTPATIENT
Start: 2025-04-21 | End: 2025-04-21

## 2025-04-21 RX ORDER — ONDANSETRON 2 MG/ML
INJECTION INTRAMUSCULAR; INTRAVENOUS AS NEEDED
Status: DISCONTINUED | OUTPATIENT
Start: 2025-04-21 | End: 2025-04-21 | Stop reason: SURG

## 2025-04-21 RX ORDER — LIDOCAINE HYDROCHLORIDE 10 MG/ML
INJECTION, SOLUTION INFILTRATION; PERINEURAL
Status: COMPLETED | OUTPATIENT
Start: 2025-04-21 | End: 2025-04-21

## 2025-04-21 RX ORDER — IBUPROFEN 600 MG/1
600 TABLET, FILM COATED ORAL EVERY 6 HOURS PRN
Qty: 60 TABLET | Refills: 0 | Status: SHIPPED | OUTPATIENT
Start: 2025-04-21

## 2025-04-21 RX ORDER — ACETAMINOPHEN 500 MG
1000 TABLET ORAL ONCE
Status: COMPLETED | OUTPATIENT
Start: 2025-04-21 | End: 2025-04-21

## 2025-04-21 RX ORDER — ONDANSETRON 2 MG/ML
4 INJECTION INTRAMUSCULAR; INTRAVENOUS EVERY 6 HOURS PRN
Status: DISCONTINUED | OUTPATIENT
Start: 2025-04-21 | End: 2025-04-21

## 2025-04-21 RX ORDER — MORPHINE SULFATE 4 MG/ML
2 INJECTION, SOLUTION INTRAMUSCULAR; INTRAVENOUS EVERY 10 MIN PRN
Status: DISCONTINUED | OUTPATIENT
Start: 2025-04-21 | End: 2025-04-21

## 2025-04-21 RX ORDER — HYDROCODONE BITARTRATE AND ACETAMINOPHEN 5; 325 MG/1; MG/1
1 TABLET ORAL EVERY 6 HOURS PRN
Qty: 15 TABLET | Refills: 0 | Status: SHIPPED | OUTPATIENT
Start: 2025-04-21

## 2025-04-21 RX ORDER — MORPHINE SULFATE 4 MG/ML
4 INJECTION, SOLUTION INTRAMUSCULAR; INTRAVENOUS EVERY 10 MIN PRN
Status: DISCONTINUED | OUTPATIENT
Start: 2025-04-21 | End: 2025-04-21

## 2025-04-21 RX ORDER — DEXAMETHASONE SODIUM PHOSPHATE 4 MG/ML
VIAL (ML) INJECTION AS NEEDED
Status: DISCONTINUED | OUTPATIENT
Start: 2025-04-21 | End: 2025-04-21 | Stop reason: SURG

## 2025-04-21 RX ORDER — HYDROMORPHONE HYDROCHLORIDE 1 MG/ML
0.4 INJECTION, SOLUTION INTRAMUSCULAR; INTRAVENOUS; SUBCUTANEOUS EVERY 5 MIN PRN
Refills: 0 | Status: DISCONTINUED | OUTPATIENT
Start: 2025-04-21 | End: 2025-04-21

## 2025-04-21 RX ORDER — HYDROMORPHONE HYDROCHLORIDE 1 MG/ML
0.8 INJECTION, SOLUTION INTRAMUSCULAR; INTRAVENOUS; SUBCUTANEOUS EVERY 2 HOUR PRN
Refills: 0 | Status: CANCELLED | OUTPATIENT
Start: 2025-04-21

## 2025-04-21 RX ORDER — SODIUM CHLORIDE, SODIUM LACTATE, POTASSIUM CHLORIDE, CALCIUM CHLORIDE 600; 310; 30; 20 MG/100ML; MG/100ML; MG/100ML; MG/100ML
INJECTION, SOLUTION INTRAVENOUS CONTINUOUS
Status: DISCONTINUED | OUTPATIENT
Start: 2025-04-21 | End: 2025-04-21

## 2025-04-21 RX ORDER — MORPHINE SULFATE 10 MG/ML
6 INJECTION, SOLUTION INTRAMUSCULAR; INTRAVENOUS EVERY 10 MIN PRN
Refills: 0 | Status: DISCONTINUED | OUTPATIENT
Start: 2025-04-21 | End: 2025-04-21

## 2025-04-21 RX ORDER — HYDROMORPHONE HYDROCHLORIDE 1 MG/ML
0.2 INJECTION, SOLUTION INTRAMUSCULAR; INTRAVENOUS; SUBCUTANEOUS EVERY 5 MIN PRN
Refills: 0 | Status: DISCONTINUED | OUTPATIENT
Start: 2025-04-21 | End: 2025-04-21

## 2025-04-21 RX ORDER — ONDANSETRON 2 MG/ML
4 INJECTION INTRAMUSCULAR; INTRAVENOUS EVERY 6 HOURS PRN
Status: CANCELLED | OUTPATIENT
Start: 2025-04-21

## 2025-04-21 RX ORDER — HYDROMORPHONE HYDROCHLORIDE 1 MG/ML
0.6 INJECTION, SOLUTION INTRAMUSCULAR; INTRAVENOUS; SUBCUTANEOUS EVERY 5 MIN PRN
Refills: 0 | Status: DISCONTINUED | OUTPATIENT
Start: 2025-04-21 | End: 2025-04-21

## 2025-04-21 RX ORDER — FAMOTIDINE 20 MG/1
20 TABLET, FILM COATED ORAL ONCE
Status: COMPLETED | OUTPATIENT
Start: 2025-04-21 | End: 2025-04-21

## 2025-04-21 RX ORDER — NALOXONE HYDROCHLORIDE 0.4 MG/ML
0.08 INJECTION, SOLUTION INTRAMUSCULAR; INTRAVENOUS; SUBCUTANEOUS AS NEEDED
Status: DISCONTINUED | OUTPATIENT
Start: 2025-04-21 | End: 2025-04-21

## 2025-04-21 RX ORDER — HYDROMORPHONE HYDROCHLORIDE 1 MG/ML
0.4 INJECTION, SOLUTION INTRAMUSCULAR; INTRAVENOUS; SUBCUTANEOUS EVERY 2 HOUR PRN
Refills: 0 | Status: CANCELLED | OUTPATIENT
Start: 2025-04-21

## 2025-04-21 RX ORDER — MIDAZOLAM HYDROCHLORIDE 1 MG/ML
INJECTION INTRAMUSCULAR; INTRAVENOUS
Status: COMPLETED | OUTPATIENT
Start: 2025-04-21 | End: 2025-04-21

## 2025-04-21 RX ORDER — HYDROCODONE BITARTRATE AND ACETAMINOPHEN 5; 325 MG/1; MG/1
1 TABLET ORAL ONCE
Refills: 0 | Status: DISCONTINUED | OUTPATIENT
Start: 2025-04-21 | End: 2025-04-21

## 2025-04-21 RX ORDER — OXYCODONE HYDROCHLORIDE 5 MG/1
10 TABLET ORAL EVERY 4 HOURS PRN
Status: DISCONTINUED | OUTPATIENT
Start: 2025-04-21 | End: 2025-04-21

## 2025-04-21 RX ORDER — DEXAMETHASONE SODIUM PHOSPHATE 10 MG/ML
INJECTION, SOLUTION INTRAMUSCULAR; INTRAVENOUS
Status: COMPLETED | OUTPATIENT
Start: 2025-04-21 | End: 2025-04-21

## 2025-04-21 RX ORDER — FAMOTIDINE 10 MG/ML
20 INJECTION, SOLUTION INTRAVENOUS ONCE
Status: COMPLETED | OUTPATIENT
Start: 2025-04-21 | End: 2025-04-21

## 2025-04-21 RX ADMIN — DEXAMETHASONE SODIUM PHOSPHATE 10 MG: 10 INJECTION, SOLUTION INTRAMUSCULAR; INTRAVENOUS at 07:10:00

## 2025-04-21 RX ADMIN — DEXAMETHASONE SODIUM PHOSPHATE 8 MG: 4 MG/ML VIAL (ML) INJECTION at 07:46:00

## 2025-04-21 RX ADMIN — LIDOCAINE HYDROCHLORIDE 3 ML: 10 INJECTION, SOLUTION INFILTRATION; PERINEURAL at 07:04:00

## 2025-04-21 RX ADMIN — ROPIVACAINE HYDROCHLORIDE 10 ML: 5 INJECTION, SOLUTION EPIDURAL; INFILTRATION; PERINEURAL at 07:14:00

## 2025-04-21 RX ADMIN — MIDAZOLAM HYDROCHLORIDE 2 MG: 1 INJECTION INTRAMUSCULAR; INTRAVENOUS at 07:04:00

## 2025-04-21 RX ADMIN — ROPIVACAINE HYDROCHLORIDE 20 ML: 5 INJECTION, SOLUTION EPIDURAL; INFILTRATION; PERINEURAL at 07:10:00

## 2025-04-21 RX ADMIN — ONDANSETRON 4 MG: 2 INJECTION INTRAMUSCULAR; INTRAVENOUS at 08:28:00

## 2025-04-21 RX ADMIN — LIDOCAINE HYDROCHLORIDE 50 MG: 10 INJECTION, SOLUTION EPIDURAL; INFILTRATION; INTRACAUDAL; PERINEURAL at 07:39:00

## 2025-04-21 RX ADMIN — MIDAZOLAM HYDROCHLORIDE 2 MG: 1 INJECTION INTRAMUSCULAR; INTRAVENOUS at 07:32:00

## 2025-04-21 RX ADMIN — SODIUM CHLORIDE, SODIUM LACTATE, POTASSIUM CHLORIDE, CALCIUM CHLORIDE: 600; 310; 30; 20 INJECTION, SOLUTION INTRAVENOUS at 07:28:00

## 2025-04-21 NOTE — ANESTHESIA PREPROCEDURE EVALUATION
Anesthesia PreOp Note    HPI:     Kyle Sprague is a 25 year old female who presents for preoperative consultation requested by: Yaneth Castaneda DO    Date of Surgery: 4/21/2025    Procedure(s):  Left foot hardware removal of first, second, and third tarsometatarsal joint, tenolysis extensor tendons  Indication: Dislocation of tarsometatarsal joint of left foot, subsequent encounter [X51.003D]  Closed displaced fracture of fourth metatarsal bone of left foot with routine healing, subsequent encounter [T33.103J]    Relevant Problems   No relevant active problems       NPO:  Last Liquid Consumption Date: 04/20/25  Last Liquid Consumption Time: 2350  Last Solid Consumption Date: 04/20/25  Last Solid Consumption Time: 2350  Last Liquid Consumption Date: 04/20/25          History Review:  Patient Active Problem List    Diagnosis Date Noted    ASCUS of cervix with negative high risk HPV 06/05/2023    Screen for STD (sexually transmitted disease) 07/08/2020    Dermatitis 07/08/2020    Perineal folliculitis 11/12/2019    Scoliosis        Past Medical History[1]    Past Surgical History[2]    Prescriptions Prior to Admission[3]  Current Medications and Prescriptions Ordered in Epic[4]    Allergies[5]    Family History[6]  Social Hx on file[7]    Available pre-op labs reviewed.  Lab Results   Component Value Date    URINEPREG Negative 04/21/2025             Vital Signs:  Body mass index is 32.01 kg/m².   height is 1.575 m (5' 2\") and weight is 79.4 kg (175 lb). Her oral temperature is 98.6 °F (37 °C). Her blood pressure is 124/74 and her pulse is 93. Her respiration is 20 and oxygen saturation is 99%.   Vitals:    04/17/25 1433 04/21/25 0605   BP:  124/74   Pulse:  93   Resp:  20   Temp:  98.6 °F (37 °C)   TempSrc:  Oral   SpO2:  99%   Weight: 77.1 kg (170 lb) 79.4 kg (175 lb)   Height: 1.575 m (5' 2\") 1.575 m (5' 2\")        Anesthesia Evaluation     Patient summary reviewed and Nursing notes reviewed    No history of  anesthetic complications   Airway   Mallampati: II  TM distance: <3 FB  Neck ROM: full  Dental - Dentition appears grossly intact     Pulmonary - negative ROS and normal exam   Cardiovascular - negative ROS and normal exam  Exercise tolerance: good    Neuro/Psych - negative ROS     GI/Hepatic/Renal - negative ROS     Endo/Other - negative ROS   Abdominal                  Anesthesia Plan:   ASA:  2  Plan:   General and regional  Airway:  LMA  Post-op Pain Management: IV analgesics, Saphenous block and Popliteal block  Informed Consent Plan and Risks Discussed With:  Patient  Discussed plan with:  CRNA      I have informed Kyle Sprague and/or legal guardian or family member of the nature of the anesthetic plan, benefits, risks including possible dental damage if relevant, major complications, and any alternative forms of anesthetic management.   All of the patient's questions were answered to the best of my ability. The patient desires the anesthetic management as planned.  Keri Trinidad MD  4/21/2025 6:49 AM  Present on Admission:  **None**           [1]   Past Medical History:   Anxiety    Back problem    scoliosis    Scoliosis   [2]   Past Surgical History:  Procedure Laterality Date    Fracture surgery Left     foot-   orif    Patient denies any surgical history     [3]   Medications Prior to Admission   Medication Sig Dispense Refill Last Dose/Taking    buPROPion  MG Oral Tablet 24 Hr Take 1 tablet (150 mg total) by mouth daily. 90 tablet 1 4/21/2025 at  5:00 AM    Etonogestrel-Ethinyl Estradiol (NUVARING) 0.12-0.015 MG/24HR Vaginal Ring Place 1 ring vaginally every 28 days 3 Ring 3 Taking   [4]   Current Facility-Administered Medications Ordered in Epic   Medication Dose Route Frequency Provider Last Rate Last Admin    lactated ringers infusion   Intravenous Continuous Yaneth Castaneda DO 20 mL/hr at 04/21/25 0614 New Bag at 04/21/25 0614    ceFAZolin (Ancef) 2g in 10mL IV syringe premix  2 g Intravenous  Once Yaneth Castaneda, DO         No current [facility]-ordered outpatient medications on file.   [5] No Known Allergies  [6]   Family History  Problem Relation Age of Onset    No Known Problems Father     No Known Problems Mother    [7]   Social History  Socioeconomic History    Marital status: Single   Tobacco Use    Smoking status: Former     Types: Cigarettes     Passive exposure: Yes    Smokeless tobacco: Never    Tobacco comments:     Father smkes outside    Vaping Use    Vaping status: Never Used   Substance and Sexual Activity    Alcohol use: Yes     Comment: occ    Drug use: No

## 2025-04-21 NOTE — ANESTHESIA POSTPROCEDURE EVALUATION
Patient: Kyle Sprague    Procedure Summary       Date: 04/21/25 Room / Location: [facility] MAIN OR  / [facility] MAIN OR    Anesthesia Start: 0728 Anesthesia Stop: 0842    Procedure: Left foot hardware removal of first, second, and third tarsometatarsal joint, tenolysis extensor tendons (Left) Diagnosis:       Dislocation of tarsometatarsal joint of left foot, subsequent encounter      Closed displaced fracture of fourth metatarsal bone of left foot with routine healing, subsequent encounter      (Dislocation of tarsometatarsal joint of left foot, subsequent encounter [S93.325D]Closed displaced fracture of fourth metatarsal bone of left foot with routine healing, subsequent encounter [S92.342D])    Surgeons: Yaneth Castaneda DO Anesthesiologist: Keri Trinidad MD    Anesthesia Type: general, regional ASA Status: 2            Anesthesia Type: general, regional    Vitals Value Taken Time   /62 04/21/25 09:21   Temp 97.7 °F (36.5 °C) 04/21/25 08:41   Pulse 91 04/21/25 09:29   Resp 15 04/21/25 09:29   SpO2 97 % 04/21/25 09:29   Vitals shown include unfiled device data.    [facility] AN Post Evaluation:   Patient Evaluated in PACU  Patient Participation: complete - patient participated  Level of Consciousness: awake and alert  Pain Score: 0  Pain Management: adequate  Airway Patency:patent  Dental exam unchanged from preop  Yes    Nausea/Vomiting: none  Cardiovascular Status: acceptable  Respiratory Status: acceptable  Postoperative Hydration acceptable      Keri Trinidad MD  4/21/2025 9:30 AM

## 2025-04-21 NOTE — ANESTHESIA PROCEDURE NOTES
Peripheral Block    Date/Time: 4/21/2025 7:10 AM    Performed by: Keri Trinidad MD  Authorized by: Keri Trinidad MD      General Information and Staff    Start Time:  4/21/2025 7:10 AM  End Time:  4/21/2025 7:14 AM  Anesthesiologist:  Keri Trinidad MD  Performed by:  Anesthesiologist  Patient Location:  PACU    Block Placement: Pre Induction  Site Identification: real time ultrasound guided and image stored and retrievable      Reason for Block: at surgeon's request and post-op pain management    Preanesthetic Checklist: 2 patient identifers, IV checked, risks and benefits discussed, monitors and equipment checked, pre-op evaluation, timeout performed, anesthesia consent and sterile technique used      Procedure Details    Patient Position:  Supine  Prep: ChloraPrep    Monitoring:  Cardiac monitor  Block Type:  Saphenous  Laterality:  Left  Injection Technique:  Single-shot    Needle    Needle Type:  Echogenic  Needle Gauge:  21 G  Needle Length:  100 mm  Reason for Ultrasound Use: appropriate spread of the medication was noted in real time and no ultrasound evidence of intravascular and/or intraneural injection            Assessment    Injection Assessment:  Good spread noted, incremental injection, low pressure, local visualized surrounding nerve on ultrasound, negative aspiration for heme and no pain on injection  Paresthesia Pain:  None  Heart Rate Change: No        Medications  4/21/2025 7:10 AM  ropivacaine (NAROPIN) injection 0.5% - Infiltration   10 mL - 4/21/2025 7:14:00 AM    Additional Comments

## 2025-04-21 NOTE — ANESTHESIA PROCEDURE NOTES
Airway  Date/Time: 4/21/2025 7:40 AM  Reason: Elective    Airway not difficult    General Information and Staff   Patient location during procedure: OR  Anesthesiologist: Keri Trinidad MD  Resident/CRNA: Nia Wilson CRNA  Performed: CRNA   Performed by: Nia Wilson CRNA  Authorized by: Keri Trinidad MD        Indications and Patient Condition  Indications for airway management: anesthesia  Sedation level: deep      Preoxygenated: yesPatient position: sniffing    Mask difficulty assessment: 0 - not attempted  No planned trial extubation    Final Airway Details    Final airway type: supraglottic airway      Successful airway: classic  Size: 4     Number of attempts at approach: 1  Number of other approaches attempted: 0

## 2025-04-21 NOTE — OPERATIVE REPORT
[facility]    PATIENT'S NAME: JOMAR DAMICO   ATTENDING PHYSICIAN: Yaneth Castaneda DO   OPERATING PHYSICIAN: Yaneth Castaneda DO   PATIENT ACCOUNT#:   [id number]    LOCATION:  [place] [address]  MEDICAL RECORD #:   [id number]       YOB: 2000  ADMISSION DATE:       04/21/2025      OPERATION DATE:  04/21/2025    OPERATIVE REPORT    PREOPERATIVE DIAGNOSIS:    1.   Left foot painful hardware.  2.   Left extensor tendon tenosynovitis.  POSTOPERATIVE DIAGNOSIS:    1.   Left foot painful hardware.  2.   Left extensor tendon tenosynovitis.  PROCEDURE:    1.   Left foot deep hardware removal of 4 separate implants with 4 separate incisions.  2.   Extensor tenolysis of third and fourth extensor tendons.    ASSISTANT:  FLYNN Soliz, is essential in aiding in reduction and assisting in repair in order to facilitate surgery and preserve the neurovascular bundle and cartilage.    ANESTHESIA:  General with a regional block.    HEMOSTASIS:  Pneumatic tourniquet inflated to 250 mmHg for approximately 35 minutes.    ESTIMATED BLOOD LOSS:  2 mL.    SPECIMEN:  Two plates and multiple screws.    COMPLICATIONS:  None.    CONDITION:  Stable to PACU.    INDICATIONS:  The patient is a 25-year-old female with a history of a bad Lisfranc injury who undergone an ORIF.  She retained hardware and was becoming painful and met surgical criteria.  All risks, benefits, and alternatives were explained to the patient including but not limited to DVT, infection, wound healing problems, bone healing problems, neurovascular damage, risk of continued pain, risk of posttraumatic arthritis, and risk for need for further surgery.  The patient understood and wished to proceed with the above procedure.    OPERATIVE TECHNIQUE:  The patient was met in the preoperative holding area and marked with an indelible marker.  She was brought back to the operative suite and placed on the operating table in a supine  position.  There anesthesia was administered by the department of anesthesia.  Once she was adequately sedated, a well-padded left thigh tourniquet was placed, and she was prepped and draped in the usual sterile fashion.  A time-out was performed.  All in the room were in agreement with patient, procedure, site of procedure.  Preoperative antibiotics were administered by the department of anesthesia prior to incision.  The limb was exsanguinated and tourniquet inflated to 250 mmHg and remained inflated for approximately 35 minutes.    LEFT FOOT DEEP HARDWARE REMOVAL:  A K-wire was inserted down the medial aspect of the foot and screw across the first TMT joint and Lisfranc were identified.  A K-wire was placed through the screw.  Incision was made down the skin and blunt dissection was carried down to the screw.  A screwdriver was inserted and the washer were removed in its entirety.  The joint was found to be stable.    In a similar manner, a second screw across the second TMT joint was identified, K-wire was placed, and a separate incision was made.  Dissection was carried down to the screw, and the screw was removed in its entirety.  A stress exam was performed and found to be stable.    An incision was made over the third TMT joint and dissection carried down.  Careful dissection was carried through the scar tissue and the plate was identified.  Screws were removed in their entirety and then the plate was elevated off the bone and removed in its entirety.  The joint was found to be stable.    In a similar manner, dissection was carried down to the fourth metatarsal and the plates over the fourth metatarsal was identified.  The screws were removed in its entirety and then a [place] elevator was used to help elevate the plate off the bone and removed as well.  The bone was found to be stable.    EXTENSOR TENOLYSIS:  Through a dorsal incision, dissection was carried down to the extensor tendons of the third and  fourth toes.  Careful dissection was carried down.  A hemostat was used to release around the tendon, and synovitis was noted across the tendons.  The tendons were found to be gliding freely after this was completed.  A scalpel was used to release a little more proximal and distally and release all the adhesions across the tendons.  The wounds were thoroughly irrigated, and incisions closed using 2-0 Vicryl for the subcutaneous tissue and a running 3-0 Monocryl for subcuticular closer.  Sterile dressing was placed consisting of Adaptic, 4 x 4's, ABD, Webril, and an Ace bandage.  The patient was placed in a postop shoe, but she will be weightbearing as tolerated through her left lower extremity, will ice and elevate her foot, and follow up in the office in 1 week.    Dictated By Yaneth Castaneda DO  d: 04/21/2025 08:32:54  t: 04/21/2025 10:10:55  Psychiatric [id number]  [facility]/

## 2025-04-21 NOTE — INTERVAL H&P NOTE
Pre-op Diagnosis: Dislocation of tarsometatarsal joint of left foot, subsequent encounter [P87.123D]  Closed displaced fracture of fourth metatarsal bone of left foot with routine healing, subsequent encounter [Y40.075G]    The above referenced H&P was reviewed by Yaneth Castaneda DO on 4/21/2025, the patient was examined and no significant changes have occurred in the patient's condition since the H&P was performed.  I discussed with the patient and/or legal representative the potential benefits, risks and side effects of this procedure; the likelihood of the patient achieving goals; and potential problems that might occur during recuperation.  I discussed reasonable alternatives to the procedure, including risks, benefits and side effects related to the alternatives and risks related to not receiving this procedure.  We will proceed with procedure as planned.

## 2025-04-21 NOTE — BRIEF OP NOTE
Pre-Operative Diagnosis: Dislocation of tarsometatarsal joint of left foot, subsequent encounter [S93.325D]  Closed displaced fracture of fourth metatarsal bone of left foot with routine healing, subsequent encounter [S92.342D]     Post-Operative Diagnosis: Dislocation of tarsometatarsal joint of left foot, subsequent encounter [S93.325D]Closed displaced fracture of fourth metatarsal bone of left foot with routine healing, subsequent encounter [S92.342D]      Procedure Performed:   Left foot hardware removal of first, second, and third tarsometatarsal joint, tenolysis extensor tendons    Surgeons and Role:     * Yaneth Castaneda DO - Primary    Assistant(s):  Surgical Assistant.: Latoya Pierce     Surgical Findings: retained hardware     Specimen: screws     Estimated Blood Loss: 2 mL    Dictation Number:  [id number]    Yaneth Castaneda DO  4/21/2025  8:28 AM

## 2025-04-21 NOTE — ANESTHESIA PROCEDURE NOTES
Peripheral Block    Date/Time: 4/21/2025 7:04 AM    Performed by: Keri Trinidad MD  Authorized by: Keri Trinidad MD      General Information and Staff    Start Time:  4/21/2025 7:04 AM  End Time:  4/21/2025 7:10 AM  Anesthesiologist:  Keri Trinidad MD  Performed by:  Anesthesiologist  Patient Location:  PACU    Block Placement: Pre Induction  Site Identification: real time ultrasound guided and image stored and retrievable      Reason for Block: at surgeon's request and post-op pain management    Preanesthetic Checklist: 2 patient identifers, IV checked, risks and benefits discussed, monitors and equipment checked, pre-op evaluation, timeout performed, anesthesia consent and sterile technique used      Procedure Details    Patient Position:  Supine  Prep: ChloraPrep and patient draped    Monitoring:  Cardiac monitor, continuous pulse ox, blood pressure cuff and heart rate  Block Type:  Popliteal  Laterality:  Left  Injection Technique:  Single-shot    Needle    Needle Type:  Short-bevel  Needle Gauge:  21 G  Needle Length:  100 mm  Needle Localization:  Ultrasound guidance  Reason for Ultrasound Use: appropriate spread of the medication was noted in real time and no ultrasound evidence of intravascular and/or intraneural injection            Assessment    Injection Assessment:  Good spread noted, incremental injection, local visualized surrounding nerve on ultrasound, low pressure, negative aspiration for heme and no pain on injection  Paresthesia Pain:  None  Heart Rate Change: No        Medications  4/21/2025 7:04 AM  midazolam (VERSED)injection 2mg/2ml - Intravenous   2 mg - 4/21/2025 7:04:00 AM  lidocaine injection 1% - Intradermal   3 mL - 4/21/2025 7:04:00 AM  ropivacaine (NAROPIN) injection 0.5% - Infiltration   20 mL - 4/21/2025 7:10:00 AM  dexamethasone (DECADRON) PF injection 10 mg/ml - Injection   10 mg - 4/21/2025 7:10:00 AM    Additional Comments

## 2025-04-22 ENCOUNTER — TELEPHONE (OUTPATIENT)
Dept: ORTHOPEDICS CLINIC | Facility: CLINIC | Age: 25
End: 2025-04-22

## 2025-04-22 NOTE — TELEPHONE ENCOUNTER
Called the patient and the mother's cell with no answer either. Office phone give will try again later

## 2025-04-23 ENCOUNTER — TELEPHONE (OUTPATIENT)
Dept: ORTHOPEDICS CLINIC | Facility: CLINIC | Age: 25
End: 2025-04-23

## 2025-04-23 NOTE — TELEPHONE ENCOUNTER
Completed Protected health information request, efaxed to [employer] [phone] and uploaded to [employer] ([employer]).

## 2025-04-24 ENCOUNTER — TELEPHONE (OUTPATIENT)
Dept: ORTHOPEDICS CLINIC | Facility: CLINIC | Age: 25
End: 2025-04-24

## 2025-04-24 NOTE — TELEPHONE ENCOUNTER
S/w mother of patient (on HIPAA)- She states that she was wondering how to proceed with dressing care for post-op patient. She has 1st post visit about 3.5 weeks after original surgery and is wondering if original surgery dressings should stay in place until follow-up.    S/w Dr Castaneda and she stated that she can remove the dressings now and take showers. She should just place dry dressing over the incision.    S/w mother and informed her of the recommendations of Dr Castaneda. She was agreeable to plan and had no other questions.

## 2025-05-14 ENCOUNTER — HOSPITAL ENCOUNTER (OUTPATIENT)
Dept: GENERAL RADIOLOGY | Age: 25
Discharge: HOME | End: 2025-05-14
Attending: ORTHOPAEDIC SURGERY
Payer: COMMERCIAL

## 2025-05-14 ENCOUNTER — OFFICE VISIT (OUTPATIENT)
Dept: ORTHOPEDICS CLINIC | Facility: CLINIC | Age: 25
End: 2025-05-14

## 2025-05-14 DIAGNOSIS — S93.325D DISLOCATION OF TARSOMETATARSAL JOINT OF LEFT FOOT, SUBSEQUENT ENCOUNTER: ICD-10-CM

## 2025-05-14 DIAGNOSIS — S92.342D CLOSED DISPLACED FRACTURE OF FOURTH METATARSAL BONE OF LEFT FOOT WITH ROUTINE HEALING, SUBSEQUENT ENCOUNTER: Primary | ICD-10-CM

## 2025-05-14 DIAGNOSIS — S92.342D CLOSED DISPLACED FRACTURE OF FOURTH METATARSAL BONE OF LEFT FOOT WITH ROUTINE HEALING, SUBSEQUENT ENCOUNTER: ICD-10-CM

## 2025-05-14 PROCEDURE — 99024 POSTOP FOLLOW-UP VISIT: CPT | Performed by: ORTHOPAEDIC SURGERY

## 2025-05-14 PROCEDURE — 73630 X-RAY EXAM OF FOOT: CPT | Performed by: ORTHOPAEDIC SURGERY

## 2025-05-15 NOTE — TELEPHONE ENCOUNTER
Protected health information request completed and faxed to [facility] [phone] confirmation received.

## 2025-05-16 NOTE — PROGRESS NOTES
Chief Complaint   Patient presents with    Post-Op     Left foot sx done on 4/21. Patient denies any pain at this time.      HPI  Kyle Sprague presents for a follow-up visit after a recent foot surgery. The patient reports overall improvement in pain and function. She states there is some discomfort with intense arching of the foot, but she can stand with full weight on it without pain. The patient is eager to return to work as soon as possible and feels capable of doing so. She mentions using compression socks to help with swelling. The surgical incisions are healing well, with one stitch attempting to pop out. The patient's mother has been assisting with care. Kyle is concerned about coordinating her return to work with her short-term disability status.      Physical Exam  Gen: NAD, alert, answering questions appropriately  HEENT: NCAT, EOMI  Lungs: NL breathing, symmetrical lung expansion  Abd: Soft, ND  Extremities:      Left Foot with neutral alignment, painless foot range of motion. DF/PF/EHL intact, SILT, cap refill brisk  Musculoskeletal: Surgical incisions healing well. No erythema or fluctuance. Single suture prominence noted. Full weight-bearing achieved without pain. Midfoot nontender    Imaging seen and reviewed by me:   3v of the left foot s/p ORIF lis franc joint and subsequent hardware removal. Neutral alignment of the foot. No lis franc joint space widening. Osteopenia present    Assessment/Plan: 25 year old year old female presenting S/p ORIF of the left foot Lisfranc joint of first second and third TMT joints, ORIF of the second third and fourth metatarsals on 1/27/2025 and subsequent hardware removal on 4/21/2025  1. Closed displaced fracture of fourth metatarsal bone of left foot with routine healing, subsequent encounter  Patient may return to work as tolerated. She can continue home exercises, orthotics, and shoewear modifications as needed.  - XR FOOT WEIGHTBEARING (3 VIEWS), LEFT  (CPT=73630); Future    2. Dislocation of tarsometatarsal joint of left foot, subsequent encounter  She can continue home exercises, orthotics, and shoewear modifications as needed.  - XR FOOT WEIGHTBEARING (3 VIEWS), LEFT (CPT=73630); Future       Return in about 6 weeks (around 6/25/2025) for xrays foot.      Yaneth Castaneda, DO  05/15/25

## 2025-08-20 DIAGNOSIS — F41.9 ANXIETY: ICD-10-CM

## 2025-08-26 RX ORDER — BUPROPION HYDROCHLORIDE 150 MG/1
150 TABLET ORAL DAILY
Qty: 30 TABLET | Refills: 0 | Status: SHIPPED | OUTPATIENT
Start: 2025-08-26

## (undated) DEVICE — CANNULATED SCREW
Type: IMPLANTABLE DEVICE | Site: FOOT | Status: NON-FUNCTIONAL
Brand: ASNIS
Removed: 2025-01-27

## (undated) DEVICE — SUT MCRYL 3-0 18IN PS-2 ABSRB UD 19MM 3/8 CIR

## (undated) DEVICE — SHEET,DRAPE,53X77,STERILE: Brand: MEDLINE

## (undated) DEVICE — DRAPE,U/ SHT,SPLIT,PLAS,STERIL: Brand: MEDLINE

## (undated) DEVICE — BANDAGE COMPR W6INXL11YD WVN COT/E CLP CLSR

## (undated) DEVICE — GAMMEX® PI HYBRID SIZE 6.5, STERILE POWDER-FREE SURGICAL GLOVE, POLYISOPRENE AND NEOPRENE BLEND: Brand: GAMMEX

## (undated) DEVICE — 3M™ COBAN™ NL STERILE NON-LATEX SELF-ADHERENT WRAP, 2084S, 4 IN X 5 YD (10 CM X 4,5 M), 18 ROLLS/CASE: Brand: 3M™ COBAN™

## (undated) DEVICE — GAMMEX® NON-LATEX PI ORTHO SIZE 6.5, STERILE POLYISOPRENE POWDER-FREE SURGICAL GLOVE: Brand: GAMMEX

## (undated) DEVICE — SUT COAT VCRL + 2-0 27IN ABSRB UD ANTIBACT CT-1

## (undated) DEVICE — APPLICATOR PREP 26ML CHG 2% ISO ALC 70%

## (undated) DEVICE — DRESSING ADAPTIC L16XW3IN OIL ADH

## (undated) DEVICE — PACK CDS LOWER EXTREMITY

## (undated) DEVICE — PAD,ABDOMINAL,8"X7.5",STERILE,LF,1/PK: Brand: MEDLINE

## (undated) DEVICE — DISPOSABLE TOURNIQUET CUFF SINGLE BLADDER, DUAL PORT AND QUICK CONNECT CONNECTOR: Brand: COLOR CUFF

## (undated) DEVICE — INTENDED FOR TISSUE SEPARATION, AND OTHER PROCEDURES THAT REQUIRE A SHARP SURGICAL BLADE TO PUNCTURE OR CUT.: Brand: BARD-PARKER ® STAINLESS STEEL BLADES

## (undated) DEVICE — TOWEL,OR,DSP,ST,BLUE,DLX,2/PK,40PK/CS: Brand: MEDLINE

## (undated) DEVICE — CANNULATED DRILL

## (undated) DEVICE — DRILL, AO, SCALED: Brand: VARIAX

## (undated) DEVICE — ANTIBACTERIAL UNDYED BRAIDED (POLYGLACTIN 910), SYNTHETIC ABSORBABLE SUTURE: Brand: COATED VICRYL

## (undated) NOTE — LETTER
1/13/2025          To Whom It May Concern:    Kyle Sprague is currently under my medical care and may not return to work at this time.    Please excuse Kyle until further notice. She will have a CT scan of the left foot and follow up in office for result. At that time we will write a new work note.  Activity is restricted as follows: .No working    If you require additional information please contact our office.        Sincerely,    Eli Merino DPM

## (undated) NOTE — LETTER
Date: 7/15/2019    Patient Name: Rae Alexandra          To Whom it may concern: This letter has been written at the patient's request. The above patient was seen at the [facility] for treatment of a medical condition.     This patient parthu

## (undated) NOTE — LETTER
Date & Time: 8/21/2023, 11:27 AM  Patient: Raymundo Worthy  Encounter Provider(s):    SUJATA Chavez       To Whom It May Concern:    Inessa Sun was seen and treated in our department on 8/21/2023. She should not return to work until 8/22/2023 .     If you have any questions or concerns, please do not hesitate to call.        _____________________________  Physician/APC Signature

## (undated) NOTE — LETTER
10/16/2024          To Whom It May Concern:    Kyle Sprague is currently under my medical care and may not return to work at this time.    Please excuse Kyle for 1 day.  She may return to work on 10/17/2024.  Activity is restricted as follows: none.    If you require additional information please contact our office.        Sincerely,    SUJATA Greenwood, FNP-BC              Document generated by:  SUJATA Greenwood

## (undated) NOTE — LETTER
Date & Time: 6/28/2020, 12:06 PM  Patient: Irene Smith  Encounter Provider(s):    SUJATA Hall       To Whom It May Concern:    Irene Smith was seen and treated in our department on 6/28/2020. She should not return to work until 6/28/20.

## (undated) NOTE — LETTER
11/17/2018          To Whom It May Concern:    Kiel Jesse is currently under my medical care. Curfew hours in [place] for 16years old and under is 12 am on weekend and 11 pm during the week.  She needs to follow those laws and please schedule her work

## (undated) NOTE — LETTER
5/14/2025          To Whom It May Concern:    Kyle Sprague is currently under my medical care and may return to work with full duties on May 15, 2025. Please allow her to sit sit for 5 minutes every hour as needed for pain relief.      If you require additional information please contact our office.        Sincerely,    Yaneth Castaneda, DO

## (undated) NOTE — LETTER
1/13/2025          To Whom It May Concern:    Kyle Sprague is currently under my medical care and may not return to work at this time.    Please excuse Kyle for  the next four weeks, due to a foot fracture. She is to have an CT of the left foot.  We will see her back in the office in 4 weeks, at which time we will re evaluate .  Activity is restricted as follows: no work.    If you require additional information please contact our office.        Sincerely,    Eli Merino DPM          Document generated by:  Germaine COLLADO MA

## (undated) NOTE — LETTER
Date & Time: 3/29/2021, 9:14 AM  Patient: Jaquelin Thomas  Encounter Provider(s):    SUJATA Hogan       To Whom It May Concern:    Elvia Stevens was seen and treated in our department on 3/29/2021.  Please excuse her from work 3/29-3/

## (undated) NOTE — Clinical Note
Ortho Surgery Request Surgery: left foot hardware removal of first, second, and third tarsometatarsal joint, tenolysis extensor tendons     Surgical Assistant: yes Surgery Day Request: around 4/21/2025 Estimated Procedure Length: 45 min Anesthesia type: General + popliteal and saphenous Block  Position: supine  Special Needs:[ x]Mini C-Arm   Equipment: Snaptiva 4.0 cannulated screw , 2.4 locking screw   Location:Main OR Post Op Visit Date: 1-2 Week in [place] CPT Code: 61169      ICD Code: Medical Clearance Needed: NA Preadmission Testing Orders: Anesthesia testing protocols and anesthesia pre op orders will be used Additional PAT orders: Pre OP Orders: Use the prophylactic antibiotic protocol Additional Pre Op Orders: PCN Allergy: No If Yes:  __X__ Admit: Hospital outpatient surgery [facility] No

## (undated) NOTE — LETTER
8/2/2022          To Whom It May Concern:    Linwood Monroy is currently under my medical care and may not return to work at this time. Please excuse Nichole Yeager for 2 days. She may return to work on 8/4/22. Activity is restricted as follows: none. If you require additional information please contact our office.         Sincerely,    SUJATA Carvalho          Document generated by:  SUJATA Carvalho

## (undated) NOTE — LETTER
10/2/2023          To Whom It May Concern:    Yaquelin Best is currently under my medical care and may not return to work at this time. Please excuse Kyle for 1 week. She may return to work on 10/9/2023. Activity is restricted as follows: none. If you require additional information please contact our office.         Sincerely,    SUJATA Leblanc, FNP-BC              Document generated by:  SUJATA Leblanc

## (undated) NOTE — Clinical Note
Ortho Surgery Request Surgery: Left foot open reduction internal fixation Lisfranc joint, open reduction internal fixation 2nd through 4th metatarsals, possible midfoot fusion, possible bone graft calcaneus     Surgical Assistant: Yes Surgery Day Request: next week Estimated Procedure Length: 2 hours Anesthesia type: General + popliteal/saphenous Block  Position: Supine  Special Needs:[x]Mini C-Arm   Equipment: Jenelle minifrag plates and screws, 4.0 headless screws  Location:Main OR Post Op Visit Date: 1-2 Week in [place] CPT Code: 97425, 47218, 02629, 41530     ICD Code: Medical Clearance Needed: __X__ Preadmission Testing Orders: Anesthesia testing protocols and anesthesia pre op orders will be used Additional PAT orders: Pre OP Orders: Use the prophylactic antibiotic protocol Additional Pre Op Orders: PCN Allergy: No If Yes:  __X__ Admit: Hospital outpatient surgery [facility] No

## (undated) NOTE — LETTER
5/13/2019          To Whom It May Concern:    Savita Domingo is currently under my medical care and may not return to school at this time. Please excuse Rito Oppenheim for 1 days. She may return to school on 5/14/19. Activity is restricted as follows: none.

## (undated) NOTE — LETTER
Date & Time: 7/3/2020, 2:33 PM  Patient: Robin Ferrara    Dear Robin Ferrara,    We have received test results pertaining to your visit to [facility] on 6/28/2020 and have not been able to contact you at your telephone number [phone] (h

## (undated) NOTE — LETTER
3/15/2019          To Whom It May Concern:    Ifeanyi Dawkins is currently under my medical care and may not return to school at this time. Please excuse Dori Mcfadden for 1 days. She may return to school on 03/18/2019.   Activity is restricted as follows: none

## (undated) NOTE — LETTER
Date & Time: 5/31/2021, 2:13 PM  Patient: Aurora Berry  Encounter Provider(s):    Rosita Carmona PA-C       To Whom It May Concern:    Cristine Govea was seen and treated in our department on 5/31/2021. She can return to work. 06/01/2021.     If yo

## (undated) NOTE — LETTER
Date & Time: 6/26/2024, 10:53 AM  Patient: Kyle Sprague  Encounter Provider(s):    Roxy Nieves APRN       To Whom It May Concern:    Kyle Srpague was seen and treated in our department on 6/26/2024. She can return to work on 6/27/26 .    If you have any questions or concerns, please do not hesitate to call.        ________Roxy Nieves_____________________  Physician/APC Signature